# Patient Record
Sex: FEMALE | Race: WHITE | NOT HISPANIC OR LATINO | URBAN - METROPOLITAN AREA
[De-identification: names, ages, dates, MRNs, and addresses within clinical notes are randomized per-mention and may not be internally consistent; named-entity substitution may affect disease eponyms.]

---

## 2023-11-05 ENCOUNTER — EMERGENCY (EMERGENCY)
Facility: HOSPITAL | Age: 33
LOS: 0 days | Discharge: HOME | End: 2023-11-06
Attending: EMERGENCY MEDICINE
Payer: COMMERCIAL

## 2023-11-05 VITALS
DIASTOLIC BLOOD PRESSURE: 83 MMHG | RESPIRATION RATE: 20 BRPM | SYSTOLIC BLOOD PRESSURE: 141 MMHG | HEART RATE: 98 BPM | OXYGEN SATURATION: 100 % | TEMPERATURE: 99 F

## 2023-11-05 DIAGNOSIS — O99.281 ENDOCRINE, NUTRITIONAL AND METABOLIC DISEASES COMPLICATING PREGNANCY, FIRST TRIMESTER: ICD-10-CM

## 2023-11-05 DIAGNOSIS — R42 DIZZINESS AND GIDDINESS: ICD-10-CM

## 2023-11-05 DIAGNOSIS — O20.9 HEMORRHAGE IN EARLY PREGNANCY, UNSPECIFIED: ICD-10-CM

## 2023-11-05 DIAGNOSIS — M54.9 DORSALGIA, UNSPECIFIED: ICD-10-CM

## 2023-11-05 DIAGNOSIS — N93.9 ABNORMAL UTERINE AND VAGINAL BLEEDING, UNSPECIFIED: ICD-10-CM

## 2023-11-05 DIAGNOSIS — O99.891 OTHER SPECIFIED DISEASES AND CONDITIONS COMPLICATING PREGNANCY: ICD-10-CM

## 2023-11-05 DIAGNOSIS — E03.9 HYPOTHYROIDISM, UNSPECIFIED: ICD-10-CM

## 2023-11-05 DIAGNOSIS — Z3A.11 11 WEEKS GESTATION OF PREGNANCY: ICD-10-CM

## 2023-11-05 LAB
ALBUMIN SERPL ELPH-MCNC: 3.9 G/DL — SIGNIFICANT CHANGE UP (ref 3.3–5)
ALBUMIN SERPL ELPH-MCNC: 3.9 G/DL — SIGNIFICANT CHANGE UP (ref 3.3–5)
ALP SERPL-CCNC: 34 U/L — LOW (ref 40–120)
ALP SERPL-CCNC: 34 U/L — LOW (ref 40–120)
ALT FLD-CCNC: 20 U/L — SIGNIFICANT CHANGE UP (ref 12–78)
ALT FLD-CCNC: 20 U/L — SIGNIFICANT CHANGE UP (ref 12–78)
ANION GAP SERPL CALC-SCNC: 6 MMOL/L — SIGNIFICANT CHANGE UP (ref 5–17)
ANION GAP SERPL CALC-SCNC: 6 MMOL/L — SIGNIFICANT CHANGE UP (ref 5–17)
APPEARANCE UR: CLEAR — SIGNIFICANT CHANGE UP
APPEARANCE UR: CLEAR — SIGNIFICANT CHANGE UP
APTT BLD: 28.6 SEC — SIGNIFICANT CHANGE UP (ref 24.5–35.6)
APTT BLD: 28.6 SEC — SIGNIFICANT CHANGE UP (ref 24.5–35.6)
AST SERPL-CCNC: 20 U/L — SIGNIFICANT CHANGE UP (ref 15–37)
AST SERPL-CCNC: 20 U/L — SIGNIFICANT CHANGE UP (ref 15–37)
BACTERIA # UR AUTO: NEGATIVE /HPF — SIGNIFICANT CHANGE UP
BACTERIA # UR AUTO: NEGATIVE /HPF — SIGNIFICANT CHANGE UP
BASOPHILS # BLD AUTO: 0.06 K/UL — SIGNIFICANT CHANGE UP (ref 0–0.2)
BASOPHILS # BLD AUTO: 0.06 K/UL — SIGNIFICANT CHANGE UP (ref 0–0.2)
BASOPHILS NFR BLD AUTO: 0.6 % — SIGNIFICANT CHANGE UP (ref 0–2)
BASOPHILS NFR BLD AUTO: 0.6 % — SIGNIFICANT CHANGE UP (ref 0–2)
BILIRUB SERPL-MCNC: 0.6 MG/DL — SIGNIFICANT CHANGE UP (ref 0.2–1.2)
BILIRUB SERPL-MCNC: 0.6 MG/DL — SIGNIFICANT CHANGE UP (ref 0.2–1.2)
BILIRUB UR-MCNC: NEGATIVE — SIGNIFICANT CHANGE UP
BILIRUB UR-MCNC: NEGATIVE — SIGNIFICANT CHANGE UP
BLD GP AB SCN SERPL QL: SIGNIFICANT CHANGE UP
BLD GP AB SCN SERPL QL: SIGNIFICANT CHANGE UP
BUN SERPL-MCNC: 8 MG/DL — SIGNIFICANT CHANGE UP (ref 7–23)
BUN SERPL-MCNC: 8 MG/DL — SIGNIFICANT CHANGE UP (ref 7–23)
CALCIUM SERPL-MCNC: 9.6 MG/DL — SIGNIFICANT CHANGE UP (ref 8.5–10.1)
CALCIUM SERPL-MCNC: 9.6 MG/DL — SIGNIFICANT CHANGE UP (ref 8.5–10.1)
CAST: 0 /LPF — SIGNIFICANT CHANGE UP (ref 0–4)
CAST: 0 /LPF — SIGNIFICANT CHANGE UP (ref 0–4)
CHLORIDE SERPL-SCNC: 109 MMOL/L — HIGH (ref 96–108)
CHLORIDE SERPL-SCNC: 109 MMOL/L — HIGH (ref 96–108)
CO2 SERPL-SCNC: 25 MMOL/L — SIGNIFICANT CHANGE UP (ref 22–31)
CO2 SERPL-SCNC: 25 MMOL/L — SIGNIFICANT CHANGE UP (ref 22–31)
COLOR SPEC: YELLOW — SIGNIFICANT CHANGE UP
COLOR SPEC: YELLOW — SIGNIFICANT CHANGE UP
CREAT SERPL-MCNC: 0.84 MG/DL — SIGNIFICANT CHANGE UP (ref 0.5–1.3)
CREAT SERPL-MCNC: 0.84 MG/DL — SIGNIFICANT CHANGE UP (ref 0.5–1.3)
DIFF PNL FLD: ABNORMAL
DIFF PNL FLD: ABNORMAL
EGFR: 95 ML/MIN/1.73M2 — SIGNIFICANT CHANGE UP
EGFR: 95 ML/MIN/1.73M2 — SIGNIFICANT CHANGE UP
EOSINOPHIL # BLD AUTO: 0.42 K/UL — SIGNIFICANT CHANGE UP (ref 0–0.5)
EOSINOPHIL # BLD AUTO: 0.42 K/UL — SIGNIFICANT CHANGE UP (ref 0–0.5)
EOSINOPHIL NFR BLD AUTO: 4.3 % — SIGNIFICANT CHANGE UP (ref 0–6)
EOSINOPHIL NFR BLD AUTO: 4.3 % — SIGNIFICANT CHANGE UP (ref 0–6)
GLUCOSE SERPL-MCNC: 113 MG/DL — HIGH (ref 70–99)
GLUCOSE SERPL-MCNC: 113 MG/DL — HIGH (ref 70–99)
GLUCOSE UR QL: NEGATIVE MG/DL — SIGNIFICANT CHANGE UP
GLUCOSE UR QL: NEGATIVE MG/DL — SIGNIFICANT CHANGE UP
HCG SERPL-ACNC: HIGH MIU/ML
HCG SERPL-ACNC: HIGH MIU/ML
HCT VFR BLD CALC: 41.6 % — SIGNIFICANT CHANGE UP (ref 34.5–45)
HCT VFR BLD CALC: 41.6 % — SIGNIFICANT CHANGE UP (ref 34.5–45)
HGB BLD-MCNC: 14.3 G/DL — SIGNIFICANT CHANGE UP (ref 11.5–15.5)
HGB BLD-MCNC: 14.3 G/DL — SIGNIFICANT CHANGE UP (ref 11.5–15.5)
IMM GRANULOCYTES NFR BLD AUTO: 0.3 % — SIGNIFICANT CHANGE UP (ref 0–0.9)
IMM GRANULOCYTES NFR BLD AUTO: 0.3 % — SIGNIFICANT CHANGE UP (ref 0–0.9)
INR BLD: 0.96 RATIO — SIGNIFICANT CHANGE UP (ref 0.85–1.18)
INR BLD: 0.96 RATIO — SIGNIFICANT CHANGE UP (ref 0.85–1.18)
KETONES UR-MCNC: NEGATIVE MG/DL — SIGNIFICANT CHANGE UP
KETONES UR-MCNC: NEGATIVE MG/DL — SIGNIFICANT CHANGE UP
LEUKOCYTE ESTERASE UR-ACNC: NEGATIVE — SIGNIFICANT CHANGE UP
LEUKOCYTE ESTERASE UR-ACNC: NEGATIVE — SIGNIFICANT CHANGE UP
LYMPHOCYTES # BLD AUTO: 2.19 K/UL — SIGNIFICANT CHANGE UP (ref 1–3.3)
LYMPHOCYTES # BLD AUTO: 2.19 K/UL — SIGNIFICANT CHANGE UP (ref 1–3.3)
LYMPHOCYTES # BLD AUTO: 22.6 % — SIGNIFICANT CHANGE UP (ref 13–44)
LYMPHOCYTES # BLD AUTO: 22.6 % — SIGNIFICANT CHANGE UP (ref 13–44)
MCHC RBC-ENTMCNC: 31.8 PG — SIGNIFICANT CHANGE UP (ref 27–34)
MCHC RBC-ENTMCNC: 31.8 PG — SIGNIFICANT CHANGE UP (ref 27–34)
MCHC RBC-ENTMCNC: 34.4 GM/DL — SIGNIFICANT CHANGE UP (ref 32–36)
MCHC RBC-ENTMCNC: 34.4 GM/DL — SIGNIFICANT CHANGE UP (ref 32–36)
MCV RBC AUTO: 92.4 FL — SIGNIFICANT CHANGE UP (ref 80–100)
MCV RBC AUTO: 92.4 FL — SIGNIFICANT CHANGE UP (ref 80–100)
MONOCYTES # BLD AUTO: 0.74 K/UL — SIGNIFICANT CHANGE UP (ref 0–0.9)
MONOCYTES # BLD AUTO: 0.74 K/UL — SIGNIFICANT CHANGE UP (ref 0–0.9)
MONOCYTES NFR BLD AUTO: 7.7 % — SIGNIFICANT CHANGE UP (ref 2–14)
MONOCYTES NFR BLD AUTO: 7.7 % — SIGNIFICANT CHANGE UP (ref 2–14)
NEUTROPHILS # BLD AUTO: 6.23 K/UL — SIGNIFICANT CHANGE UP (ref 1.8–7.4)
NEUTROPHILS # BLD AUTO: 6.23 K/UL — SIGNIFICANT CHANGE UP (ref 1.8–7.4)
NEUTROPHILS NFR BLD AUTO: 64.5 % — SIGNIFICANT CHANGE UP (ref 43–77)
NEUTROPHILS NFR BLD AUTO: 64.5 % — SIGNIFICANT CHANGE UP (ref 43–77)
NITRITE UR-MCNC: NEGATIVE — SIGNIFICANT CHANGE UP
NITRITE UR-MCNC: NEGATIVE — SIGNIFICANT CHANGE UP
PH UR: 6 — SIGNIFICANT CHANGE UP (ref 5–8)
PH UR: 6 — SIGNIFICANT CHANGE UP (ref 5–8)
PLATELET # BLD AUTO: 221 K/UL — SIGNIFICANT CHANGE UP (ref 150–400)
PLATELET # BLD AUTO: 221 K/UL — SIGNIFICANT CHANGE UP (ref 150–400)
POTASSIUM SERPL-MCNC: 3.7 MMOL/L — SIGNIFICANT CHANGE UP (ref 3.5–5.3)
POTASSIUM SERPL-MCNC: 3.7 MMOL/L — SIGNIFICANT CHANGE UP (ref 3.5–5.3)
POTASSIUM SERPL-SCNC: 3.7 MMOL/L — SIGNIFICANT CHANGE UP (ref 3.5–5.3)
POTASSIUM SERPL-SCNC: 3.7 MMOL/L — SIGNIFICANT CHANGE UP (ref 3.5–5.3)
PROT SERPL-MCNC: 7.6 GM/DL — SIGNIFICANT CHANGE UP (ref 6–8.3)
PROT SERPL-MCNC: 7.6 GM/DL — SIGNIFICANT CHANGE UP (ref 6–8.3)
PROT UR-MCNC: NEGATIVE MG/DL — SIGNIFICANT CHANGE UP
PROT UR-MCNC: NEGATIVE MG/DL — SIGNIFICANT CHANGE UP
PROTHROM AB SERPL-ACNC: 10.9 SEC — SIGNIFICANT CHANGE UP (ref 9.5–13)
PROTHROM AB SERPL-ACNC: 10.9 SEC — SIGNIFICANT CHANGE UP (ref 9.5–13)
RBC # BLD: 4.5 M/UL — SIGNIFICANT CHANGE UP (ref 3.8–5.2)
RBC # BLD: 4.5 M/UL — SIGNIFICANT CHANGE UP (ref 3.8–5.2)
RBC # FLD: 12.6 % — SIGNIFICANT CHANGE UP (ref 10.3–14.5)
RBC # FLD: 12.6 % — SIGNIFICANT CHANGE UP (ref 10.3–14.5)
RBC CASTS # UR COMP ASSIST: 308 /HPF — HIGH (ref 0–4)
RBC CASTS # UR COMP ASSIST: 308 /HPF — HIGH (ref 0–4)
SODIUM SERPL-SCNC: 140 MMOL/L — SIGNIFICANT CHANGE UP (ref 135–145)
SODIUM SERPL-SCNC: 140 MMOL/L — SIGNIFICANT CHANGE UP (ref 135–145)
SP GR SPEC: 1.01 — SIGNIFICANT CHANGE UP (ref 1–1.03)
SP GR SPEC: 1.01 — SIGNIFICANT CHANGE UP (ref 1–1.03)
SQUAMOUS # UR AUTO: 0 /HPF — SIGNIFICANT CHANGE UP (ref 0–5)
SQUAMOUS # UR AUTO: 0 /HPF — SIGNIFICANT CHANGE UP (ref 0–5)
UROBILINOGEN FLD QL: 0.2 MG/DL — SIGNIFICANT CHANGE UP (ref 0.2–1)
UROBILINOGEN FLD QL: 0.2 MG/DL — SIGNIFICANT CHANGE UP (ref 0.2–1)
WBC # BLD: 9.67 K/UL — SIGNIFICANT CHANGE UP (ref 3.8–10.5)
WBC # BLD: 9.67 K/UL — SIGNIFICANT CHANGE UP (ref 3.8–10.5)
WBC # FLD AUTO: 9.67 K/UL — SIGNIFICANT CHANGE UP (ref 3.8–10.5)
WBC # FLD AUTO: 9.67 K/UL — SIGNIFICANT CHANGE UP (ref 3.8–10.5)
WBC UR QL: 0 /HPF — SIGNIFICANT CHANGE UP (ref 0–5)
WBC UR QL: 0 /HPF — SIGNIFICANT CHANGE UP (ref 0–5)

## 2023-11-05 PROCEDURE — 80053 COMPREHEN METABOLIC PANEL: CPT

## 2023-11-05 PROCEDURE — 84702 CHORIONIC GONADOTROPIN TEST: CPT

## 2023-11-05 PROCEDURE — 76817 TRANSVAGINAL US OBSTETRIC: CPT

## 2023-11-05 PROCEDURE — 86901 BLOOD TYPING SEROLOGIC RH(D): CPT

## 2023-11-05 PROCEDURE — 86900 BLOOD TYPING SEROLOGIC ABO: CPT

## 2023-11-05 PROCEDURE — 87086 URINE CULTURE/COLONY COUNT: CPT

## 2023-11-05 PROCEDURE — 86850 RBC ANTIBODY SCREEN: CPT

## 2023-11-05 PROCEDURE — 81001 URINALYSIS AUTO W/SCOPE: CPT

## 2023-11-05 PROCEDURE — 99284 EMERGENCY DEPT VISIT MOD MDM: CPT

## 2023-11-05 PROCEDURE — 36415 COLL VENOUS BLD VENIPUNCTURE: CPT

## 2023-11-05 PROCEDURE — 85730 THROMBOPLASTIN TIME PARTIAL: CPT

## 2023-11-05 PROCEDURE — 85025 COMPLETE CBC W/AUTO DIFF WBC: CPT

## 2023-11-05 PROCEDURE — 76817 TRANSVAGINAL US OBSTETRIC: CPT | Mod: 26

## 2023-11-05 PROCEDURE — 99284 EMERGENCY DEPT VISIT MOD MDM: CPT | Mod: 25

## 2023-11-05 PROCEDURE — 85610 PROTHROMBIN TIME: CPT

## 2023-11-05 NOTE — ED STATDOCS - PROGRESS NOTE DETAILS
Marquise Hoskins   performed pelvic exam pt with pooling of blood within vaginal vault unable to visualize secondary to bleeding no tissue or clot in the vaginal vault Marquise Hoskins   Performed pelvic exam pt with pooling of blood within vaginal vault unable to visualize secondary to bleeding no tissue or clot in the vaginal vault.  Chaperone: Shahla Choe PA-C 33 y/o female  at 11 weeks gestation presents to ED c/o sudden onset heavy vaginal bleeding today. OB Dr. Duenas. Last OB appt this past week with no issues. No abdominal pain or cramping. VSS on arrival, on physical exam pt well appearing in NAD, heart RRR lungs CTAB, abd s/nt/nd, neuro grossly intact. Pelvic performed by Dr. Hoskins chaperoned by myself, positive for pooling of blood, no tissue in vaginal vault, unable to visualize cervix d/t bleeding. Pt endorses passing tissue prior to pelvic exam. Will follow labs, ultrasound, reeval. - Shahla Choe PA-C Labs and imaging reviewed. Labs within normal limits. Urine demonstrated blood, no UTI. US with single live gestation at 11 weeks,  bpm, subchorionic hemorrhage, suggestion of prominent brain vesicles and Query gut herniation at the level of midabdomen, possible normal embryology development but recommend f/u with maternal fetal medicine to r/o genetic disorder. Discussed all findings with patient. Advised to call Dr. Duenas tomorrow for follow up. Discussed resting, bleeding precautions, and nothing in vagina until f/u with OB. Patient understands and agrees. Strict return precautions were given. All questions and concerns were addressed. - Shahla Choe PA-C Labs and imaging reviewed. Labs within normal limits. Urine demonstrated blood, no UTI. US with single live gestation at 11 weeks,  bpm, subchorionic hemorrhage, suggestion of prominent brain vesicles and Query gut herniation at the level of midabdomen, possible normal embryology development but recommend f/u with maternal fetal medicine to r/o genetic disorder. Discussed all findings with patient. Discussed that this could still possibly be start of miscarriage. Advised to call Dr. Duenas tomorrow morning for follow up as soon as possible. Discussed bleeding precautions and pelvic rest until f/u with OB. Patient understands and agrees. Strict return precautions were given. All questions and concerns were addressed. - Shahla Choe PA-C

## 2023-11-05 NOTE — ED STATDOCS - PATIENT PORTAL LINK FT
You can access the FollowMyHealth Patient Portal offered by [facility] by registering at the following website: [email or web address]. By joining [facility]’s FollowMyHealth portal, you will also be able to view your health information using other applications (apps) compatible with our system.

## 2023-11-05 NOTE — ED STATDOCS - NSFOLLOWUPINSTRUCTIONS_ED_ALL_ED_FT
Call Dr. Duenas to schedule follow up as soon as possible.    Subchorionic Hemorrhage    WHAT YOU NEED TO KNOW:    A subchorionic hemorrhage (MARÍA ELENA), or hematoma, is a collection of blood between the placenta and the uterus. MARÍA ELENA usually develops late in the first trimester. The bleeding usually reabsorbs into your body by 20 weeks of pregnancy. Most pregnancies progress without problems. You may have occasional spotting or light bleeding throughout your pregnancy.    DISCHARGE INSTRUCTIONS:    Return to the emergency department if:    You have a fever.    You have abdominal pain.    You have a sudden increase in bleeding.  Contact your healthcare provider if:    Your bleeding has increased.    You have questions or concerns about your condition or care.  Pelvic rest: Do not have sex, douche, or use tampons. Do not strain or lift heavy objects. These activities may cause contractions or infection and put you or your baby at risk. You may need to rest more than usual. Do daily activities as directed.    Follow up with your healthcare provider as directed: You may need to return frequently for ultrasounds. Write down your questions so you remember to ask them during your visits. Call Dr. Duenas tomorrow morning to schedule follow up as soon as possible.    Subchorionic Hemorrhage    WHAT YOU NEED TO KNOW:    A subchorionic hemorrhage (MARÍA ELENA), or hematoma, is a collection of blood between the placenta and the uterus. MARÍA ELENA usually develops late in the first trimester. The bleeding usually reabsorbs into your body by 20 weeks of pregnancy. Most pregnancies progress without problems. You may have occasional spotting or light bleeding throughout your pregnancy.    DISCHARGE INSTRUCTIONS:    Return to the emergency department if:    You have a fever.    You have abdominal pain.    You have a sudden increase in bleeding.  Contact your healthcare provider if:    Your bleeding has increased.    You have questions or concerns about your condition or care.  Pelvic rest: Do not have sex, douche, or use tampons. Do not strain or lift heavy objects. These activities may cause contractions or infection and put you or your baby at risk. You may need to rest more than usual. Do daily activities as directed.    Follow up with your healthcare provider as directed: You may need to return frequently for ultrasounds. Write down your questions so you remember to ask them during your visits.

## 2023-11-05 NOTE — ED ADULT TRIAGE NOTE - CHIEF COMPLAINT QUOTE
Pt ambulatory to triage. Pt states that she is 11 weeks pregnant and started heavy vaginal bleeding with sudden onset right before coming in. Pt denies dizziness at this time. NKDA noted.

## 2023-11-05 NOTE — ED ADULT NURSE NOTE - NSFALLUNIVINTERV_ED_ALL_ED
Bed/Stretcher in lowest position, wheels locked, appropriate side rails in place/Call bell, personal items and telephone in reach/Instruct patient to call for assistance before getting out of bed/chair/stretcher/Non-slip footwear applied when patient is off stretcher/[place] to call system/Physically safe environment - no spills, clutter or unnecessary equipment/Purposeful proactive rounding/Room/bathroom lighting operational, light cord in reach

## 2023-11-05 NOTE — ED STATDOCS - CLINICAL SUMMARY MEDICAL DECISION MAKING FREE TEXT BOX
Patient with threatened , subchorionic hemorrhage.  Radiologist recommending MFM given possible anatomic abnormality.  Bleeding precautions given.  Strict return precautions given.

## 2023-11-05 NOTE — ED STATDOCS - OBJECTIVE STATEMENT
31 yo female wPMHx of hypothyroidism presents to the ED c/o heavy vaginal bleeding with sudden onset right before coming in around 8:30am. Pt states she is 11 weeks pregnant. Pt states that it's like water rushing through. This is her 1st pregnancy. Pt had a OB-GYN appointment this past week where things were normal. Pt endorses back pain and slight lightheadedness. Denies pelvic pain.

## 2023-11-06 VITALS
DIASTOLIC BLOOD PRESSURE: 73 MMHG | HEART RATE: 80 BPM | TEMPERATURE: 99 F | OXYGEN SATURATION: 100 % | RESPIRATION RATE: 18 BRPM | SYSTOLIC BLOOD PRESSURE: 108 MMHG

## 2023-11-07 LAB
CULTURE RESULTS: SIGNIFICANT CHANGE UP
CULTURE RESULTS: SIGNIFICANT CHANGE UP
SPECIMEN SOURCE: SIGNIFICANT CHANGE UP
SPECIMEN SOURCE: SIGNIFICANT CHANGE UP

## 2024-02-06 ENCOUNTER — EMERGENCY (EMERGENCY)
Facility: HOSPITAL | Age: 34
LOS: 0 days | Discharge: HOME | End: 2024-02-06
Attending: EMERGENCY MEDICINE
Payer: COMMERCIAL

## 2024-02-06 VITALS
SYSTOLIC BLOOD PRESSURE: 103 MMHG | OXYGEN SATURATION: 97 % | RESPIRATION RATE: 16 BRPM | DIASTOLIC BLOOD PRESSURE: 65 MMHG | HEART RATE: 91 BPM

## 2024-02-06 VITALS — WEIGHT: 160.06 LBS | HEIGHT: 63 IN

## 2024-02-06 DIAGNOSIS — O99.112 OTHER DISEASES OF THE BLOOD AND BLOOD-FORMING ORGANS AND CERTAIN DISORDERS INVOLVING THE IMMUNE MECHANISM COMPLICATING PREGNANCY, SECOND TRIMESTER: ICD-10-CM

## 2024-02-06 DIAGNOSIS — O21.9 VOMITING OF PREGNANCY, UNSPECIFIED: ICD-10-CM

## 2024-02-06 DIAGNOSIS — D72.829 ELEVATED WHITE BLOOD CELL COUNT, UNSPECIFIED: ICD-10-CM

## 2024-02-06 DIAGNOSIS — E03.9 HYPOTHYROIDISM, UNSPECIFIED: ICD-10-CM

## 2024-02-06 DIAGNOSIS — R11.2 NAUSEA WITH VOMITING, UNSPECIFIED: ICD-10-CM

## 2024-02-06 DIAGNOSIS — O99.282 ENDOCRINE, NUTRITIONAL AND METABOLIC DISEASES COMPLICATING PREGNANCY, SECOND TRIMESTER: ICD-10-CM

## 2024-02-06 DIAGNOSIS — Z20.822 CONTACT WITH AND (SUSPECTED) EXPOSURE TO COVID-19: ICD-10-CM

## 2024-02-06 DIAGNOSIS — Z3A.24 24 WEEKS GESTATION OF PREGNANCY: ICD-10-CM

## 2024-02-06 LAB
ALBUMIN SERPL ELPH-MCNC: 3.1 G/DL — LOW (ref 3.3–5)
ALP SERPL-CCNC: 57 U/L — SIGNIFICANT CHANGE UP (ref 40–120)
ALT FLD-CCNC: 28 U/L — SIGNIFICANT CHANGE UP (ref 12–78)
ANION GAP SERPL CALC-SCNC: 5 MMOL/L — SIGNIFICANT CHANGE UP (ref 5–17)
APPEARANCE UR: CLEAR — SIGNIFICANT CHANGE UP
AST SERPL-CCNC: 19 U/L — SIGNIFICANT CHANGE UP (ref 15–37)
BASOPHILS # BLD AUTO: 0.09 K/UL — SIGNIFICANT CHANGE UP (ref 0–0.2)
BASOPHILS NFR BLD AUTO: 0.6 % — SIGNIFICANT CHANGE UP (ref 0–2)
BILIRUB SERPL-MCNC: 0.4 MG/DL — SIGNIFICANT CHANGE UP (ref 0.2–1.2)
BILIRUB UR-MCNC: NEGATIVE — SIGNIFICANT CHANGE UP
BUN SERPL-MCNC: 12 MG/DL — SIGNIFICANT CHANGE UP (ref 7–23)
CALCIUM SERPL-MCNC: 8.9 MG/DL — SIGNIFICANT CHANGE UP (ref 8.5–10.1)
CHLORIDE SERPL-SCNC: 107 MMOL/L — SIGNIFICANT CHANGE UP (ref 96–108)
CO2 SERPL-SCNC: 22 MMOL/L — SIGNIFICANT CHANGE UP (ref 22–31)
COLOR SPEC: YELLOW — SIGNIFICANT CHANGE UP
CREAT SERPL-MCNC: 0.69 MG/DL — SIGNIFICANT CHANGE UP (ref 0.5–1.3)
DIFF PNL FLD: NEGATIVE — SIGNIFICANT CHANGE UP
EGFR: 117 ML/MIN/1.73M2 — SIGNIFICANT CHANGE UP
EOSINOPHIL # BLD AUTO: 0.39 K/UL — SIGNIFICANT CHANGE UP (ref 0–0.5)
EOSINOPHIL NFR BLD AUTO: 2.4 % — SIGNIFICANT CHANGE UP (ref 0–6)
FLUAV AG NPH QL: SIGNIFICANT CHANGE UP
FLUBV AG NPH QL: SIGNIFICANT CHANGE UP
GLUCOSE SERPL-MCNC: 113 MG/DL — HIGH (ref 70–99)
GLUCOSE UR QL: NEGATIVE MG/DL — SIGNIFICANT CHANGE UP
HCT VFR BLD CALC: 37.7 % — SIGNIFICANT CHANGE UP (ref 34.5–45)
HGB BLD-MCNC: 12.7 G/DL — SIGNIFICANT CHANGE UP (ref 11.5–15.5)
IMM GRANULOCYTES NFR BLD AUTO: 0.9 % — SIGNIFICANT CHANGE UP (ref 0–0.9)
KETONES UR-MCNC: NEGATIVE MG/DL — SIGNIFICANT CHANGE UP
LEUKOCYTE ESTERASE UR-ACNC: NEGATIVE — SIGNIFICANT CHANGE UP
LYMPHOCYTES # BLD AUTO: 14.5 % — SIGNIFICANT CHANGE UP (ref 13–44)
LYMPHOCYTES # BLD AUTO: 2.34 K/UL — SIGNIFICANT CHANGE UP (ref 1–3.3)
MCHC RBC-ENTMCNC: 30.5 PG — SIGNIFICANT CHANGE UP (ref 27–34)
MCHC RBC-ENTMCNC: 33.7 GM/DL — SIGNIFICANT CHANGE UP (ref 32–36)
MCV RBC AUTO: 90.6 FL — SIGNIFICANT CHANGE UP (ref 80–100)
MONOCYTES # BLD AUTO: 0.89 K/UL — SIGNIFICANT CHANGE UP (ref 0–0.9)
MONOCYTES NFR BLD AUTO: 5.5 % — SIGNIFICANT CHANGE UP (ref 2–14)
NEUTROPHILS # BLD AUTO: 12.27 K/UL — HIGH (ref 1.8–7.4)
NEUTROPHILS NFR BLD AUTO: 76.1 % — SIGNIFICANT CHANGE UP (ref 43–77)
NITRITE UR-MCNC: NEGATIVE — SIGNIFICANT CHANGE UP
PH UR: 6 — SIGNIFICANT CHANGE UP (ref 5–8)
PLATELET # BLD AUTO: 231 K/UL — SIGNIFICANT CHANGE UP (ref 150–400)
POTASSIUM SERPL-MCNC: 3.7 MMOL/L — SIGNIFICANT CHANGE UP (ref 3.5–5.3)
POTASSIUM SERPL-SCNC: 3.7 MMOL/L — SIGNIFICANT CHANGE UP (ref 3.5–5.3)
PROT SERPL-MCNC: 7 GM/DL — SIGNIFICANT CHANGE UP (ref 6–8.3)
PROT UR-MCNC: SIGNIFICANT CHANGE UP MG/DL
RBC # BLD: 4.16 M/UL — SIGNIFICANT CHANGE UP (ref 3.8–5.2)
RBC # FLD: 12.7 % — SIGNIFICANT CHANGE UP (ref 10.3–14.5)
RSV RNA NPH QL NAA+NON-PROBE: SIGNIFICANT CHANGE UP
SARS-COV-2 RNA SPEC QL NAA+PROBE: SIGNIFICANT CHANGE UP
SODIUM SERPL-SCNC: 134 MMOL/L — LOW (ref 135–145)
SP GR SPEC: 1.02 — SIGNIFICANT CHANGE UP (ref 1–1.03)
UROBILINOGEN FLD QL: 0.2 MG/DL — SIGNIFICANT CHANGE UP (ref 0.2–1)
WBC # BLD: 16.12 K/UL — HIGH (ref 3.8–10.5)
WBC # FLD AUTO: 16.12 K/UL — HIGH (ref 3.8–10.5)

## 2024-02-06 PROCEDURE — 96375 TX/PRO/DX INJ NEW DRUG ADDON: CPT

## 2024-02-06 PROCEDURE — 80053 COMPREHEN METABOLIC PANEL: CPT

## 2024-02-06 PROCEDURE — 99285 EMERGENCY DEPT VISIT HI MDM: CPT

## 2024-02-06 PROCEDURE — 0241U: CPT

## 2024-02-06 PROCEDURE — 76815 OB US LIMITED FETUS(S): CPT

## 2024-02-06 PROCEDURE — 76815 OB US LIMITED FETUS(S): CPT | Mod: 26

## 2024-02-06 PROCEDURE — 36415 COLL VENOUS BLD VENIPUNCTURE: CPT

## 2024-02-06 PROCEDURE — 81003 URINALYSIS AUTO W/O SCOPE: CPT

## 2024-02-06 PROCEDURE — 96376 TX/PRO/DX INJ SAME DRUG ADON: CPT

## 2024-02-06 PROCEDURE — 96374 THER/PROPH/DIAG INJ IV PUSH: CPT

## 2024-02-06 PROCEDURE — 85025 COMPLETE CBC W/AUTO DIFF WBC: CPT

## 2024-02-06 PROCEDURE — 99284 EMERGENCY DEPT VISIT MOD MDM: CPT | Mod: 25

## 2024-02-06 RX ORDER — ALLOPURINOL 300 MG
1 TABLET ORAL
Refills: 0 | DISCHARGE

## 2024-02-06 RX ORDER — LOSARTAN POTASSIUM 100 MG/1
1 TABLET, FILM COATED ORAL
Refills: 0 | DISCHARGE

## 2024-02-06 RX ORDER — ONDANSETRON 8 MG/1
4 TABLET, FILM COATED ORAL ONCE
Refills: 0 | Status: COMPLETED | OUTPATIENT
Start: 2024-02-06 | End: 2024-02-06

## 2024-02-06 RX ORDER — AMLODIPINE BESYLATE 2.5 MG/1
1 TABLET ORAL
Refills: 0 | DISCHARGE

## 2024-02-06 RX ORDER — SODIUM CHLORIDE 9 MG/ML
1000 INJECTION INTRAMUSCULAR; INTRAVENOUS; SUBCUTANEOUS ONCE
Refills: 0 | Status: COMPLETED | OUTPATIENT
Start: 2024-02-06 | End: 2024-02-06

## 2024-02-06 RX ORDER — SENNA PLUS 8.6 MG/1
2 TABLET ORAL
Refills: 0 | DISCHARGE

## 2024-02-06 RX ORDER — ATORVASTATIN CALCIUM 80 MG/1
1 TABLET, FILM COATED ORAL
Refills: 0 | DISCHARGE

## 2024-02-06 RX ORDER — MECLIZINE HCL 12.5 MG
25 TABLET ORAL ONCE
Refills: 0 | Status: COMPLETED | OUTPATIENT
Start: 2024-02-06 | End: 2024-02-06

## 2024-02-06 RX ORDER — HYDROMORPHONE HYDROCHLORIDE 2 MG/ML
0 INJECTION INTRAMUSCULAR; INTRAVENOUS; SUBCUTANEOUS
Refills: 0 | DISCHARGE

## 2024-02-06 RX ORDER — ASPIRIN/CALCIUM CARB/MAGNESIUM 324 MG
1 TABLET ORAL
Refills: 0 | DISCHARGE

## 2024-02-06 RX ORDER — METOCLOPRAMIDE HCL 10 MG
1 TABLET ORAL
Qty: 12 | Refills: 0
Start: 2024-02-06 | End: 2024-02-08

## 2024-02-06 RX ORDER — CARVEDILOL PHOSPHATE 80 MG/1
1 CAPSULE, EXTENDED RELEASE ORAL
Refills: 0 | DISCHARGE

## 2024-02-06 RX ORDER — TIZANIDINE 4 MG/1
1 TABLET ORAL
Refills: 0 | DISCHARGE

## 2024-02-06 RX ORDER — FAMOTIDINE 10 MG/ML
20 INJECTION INTRAVENOUS ONCE
Refills: 0 | Status: COMPLETED | OUTPATIENT
Start: 2024-02-06 | End: 2024-02-06

## 2024-02-06 RX ORDER — METOCLOPRAMIDE HCL 10 MG
10 TABLET ORAL ONCE
Refills: 0 | Status: COMPLETED | OUTPATIENT
Start: 2024-02-06 | End: 2024-02-06

## 2024-02-06 RX ORDER — BUDESONIDE AND FORMOTEROL FUMARATE DIHYDRATE 160; 4.5 UG/1; UG/1
2 AEROSOL RESPIRATORY (INHALATION)
Refills: 0 | DISCHARGE

## 2024-02-06 RX ORDER — POLYETHYLENE GLYCOL 3350 17 G/17G
17 POWDER, FOR SOLUTION ORAL
Refills: 0 | DISCHARGE

## 2024-02-06 RX ORDER — SODIUM CHLORIDE 9 MG/ML
1000 INJECTION, SOLUTION INTRAVENOUS ONCE
Refills: 0 | Status: COMPLETED | OUTPATIENT
Start: 2024-02-06 | End: 2024-02-06

## 2024-02-06 RX ADMIN — ONDANSETRON 4 MILLIGRAM(S): 8 TABLET, FILM COATED ORAL at 09:07

## 2024-02-06 RX ADMIN — SODIUM CHLORIDE 1000 MILLILITER(S): 9 INJECTION INTRAMUSCULAR; INTRAVENOUS; SUBCUTANEOUS at 08:05

## 2024-02-06 RX ADMIN — FAMOTIDINE 20 MILLIGRAM(S): 10 INJECTION INTRAVENOUS at 12:31

## 2024-02-06 RX ADMIN — Medication 10 MILLIGRAM(S): at 12:31

## 2024-02-06 RX ADMIN — SODIUM CHLORIDE 5 MILLILITER(S): 9 INJECTION, SOLUTION INTRAVENOUS at 12:31

## 2024-02-06 RX ADMIN — SODIUM CHLORIDE 2000 MILLILITER(S): 9 INJECTION INTRAMUSCULAR; INTRAVENOUS; SUBCUTANEOUS at 07:01

## 2024-02-06 RX ADMIN — Medication 25 MILLIGRAM(S): at 11:12

## 2024-02-06 RX ADMIN — ONDANSETRON 4 MILLIGRAM(S): 8 TABLET, FILM COATED ORAL at 07:01

## 2024-02-06 NOTE — ED ADULT NURSE NOTE - OBJECTIVE STATEMENT
Pt is 32yo female, A&Ox4, breathing unlabored, presenting to the ED with c/o vomiting and dizziness since 10pm. Pt is 24weeks pregnant . Pt has hx of subchorionic hematoma which has resolved in the last month. Pt has been unable to keep down foods or liquids, pt denies any pain, SOB, chest pain and fevers / chills. Pt denies eating anything unusual, recent illness & sick contacts. Pt has PMHx of hypothyroidism and takes synthroid daily 112mg. Pt is resting in stretcher with  at bedside.

## 2024-02-06 NOTE — ED PROVIDER NOTE - OBJECTIVE STATEMENT
34 y/o female  24 weeks pregnant with a PMHx of subchorionic hematoma from 10-18 weeks, hypothyroid presents to the ED c/o n/v since 10pm last night. NKDA. No other complaints at this time. 32 y/o female  24 weeks pregnant with a PMHx of subchorionic hematoma from 10-18 weeks, hypothyroid presents to the ED c/o n/v since 10pm last night. Denies vaginal bleeding/leakage of fluid. NKDA. No other complaints at this time.

## 2024-02-06 NOTE — ED ADULT TRIAGE NOTE - CHIEF COMPLAINT QUOTE
pt presented to er c/o vomiting since . pt is  24 weeks pregnant. pt also endorsing lightheadedness and diarrhea. denies chest pain, shortness of breath. past medical history of subchorionic hematoma from 10-18 weeks that has resolved. no meds taken prior to arrival. NKA. as per [place] l&timbo, pt to stay in ED.

## 2024-02-06 NOTE — ED PROVIDER NOTE - NSFOLLOWUPINSTRUCTIONS_ED_ALL_ED_FT
Nausea and Vomiting, Adult  Nausea is feeling that you have an upset stomach and that you are about to vomit. Vomiting is when food in your stomach forcefully comes out of your mouth. Vomiting can make you feel weak. If you vomit, or if you are not able to drink enough fluids, you may not have enough water in your body (get dehydrated). If you do not have enough water in your body, you may:  Feel tired.  Feel thirsty.  Have a dry mouth.  Have cracked lips.  Pee (urinate) less often.  Older adults and people with other diseases or a weak body defense system (immune system) are at higher risk for not having enough water in the body. If you feel like you may vomit or you vomit, it is important to follow instructions from your doctor about how to take care of yourself.    Follow these instructions at home:  Watch your symptoms for any changes. Tell your doctor about them.    Eating and drinking    A bottle of clear fruit juice and glass of water.  A sign showing that a person should not drink alcohol.  Take an ORS (oral rehydration solution). This is a drink that is sold at pharmacies and stores.  Drink clear fluids in small amounts as you are able, such as:  Water.  Ice chips.  Fruit juice that has water added (diluted fruit juice).  Low-calorie sports drinks.  Eat bland, easy-to-digest foods in small amounts as you are able, such as:  Bananas.  Applesauce.  Rice.  Low-fat (lean) meats.  Toast.  Crackers.  Avoid drinking fluids that have a lot of sugar or caffeine in them. This includes energy drinks, sports drinks, and soda.  Avoid alcohol.  Avoid spicy or fatty foods.  General instructions    Take over-the-counter and prescription medicines only as told by your doctor.  Drink enough fluid to keep your pee (urine) pale yellow.  Wash your hands often with soap and water for at least 20 seconds. If you cannot use soap and water, use hand .  Make sure that everyone in your home washes their hands well and often.  Rest at home until you feel better.  Watch your condition for any changes.  Take slow and deep breaths when you feel like you may vomit.  Keep all follow-up visits.  Contact a doctor if:  Your symptoms get worse.  You have new symptoms.  You have a fever.  You cannot drink fluids without vomiting.  You feel like you may vomit for more than 2 days.  You feel light-headed or dizzy.  You have a headache.  You have muscle cramps.  You have a rash.  You have pain while peeing.  Get help right away if:  You have pain in your chest, neck, arm, or jaw.  You feel very weak or you faint.  You vomit again and again.  You have vomit that is bright red or looks like black coffee grounds.  You have bloody or black poop (stools) or poop that looks like tar.  You have a very bad headache, a stiff neck, or both.  You have very bad pain, cramping, or bloating in your belly (abdomen).  You have trouble breathing.  You are breathing very quickly.  Your heart is beating very quickly.  Your skin feels cold and clammy.  You feel confused.  You have signs of losing too much water in your body, such as:  Dark pee, very little pee, or no pee.  Cracked lips.  Dry mouth.  Sunken eyes.  Sleepiness.  Weakness.  These symptoms may be an emergency. Get help right away. Call 911.  Do not wait to see if the symptoms will go away.  Do not drive yourself to the hospital.  Summary  Nausea is feeling that you have an upset stomach and that you are about to vomit. Vomiting is when food in your stomach comes out of your mouth.  Follow instructions from your doctor about eating and drinking.  Take over-the-counter and prescription medicines only as told by your doctor.  Contact your doctor if your symptoms get worse or you have new symptoms.  Keep all follow-up visits.  This information is not intended to replace advice given to you by your health care provider. Make sure you discuss any questions you have with your health care provider.    Document Revised: 06/24/2022 Document Reviewed: 06/24/2022  [employer] Patient Education © 2023 [employer]  [employer] logo  Terms and Conditions  Privacy Policy  Editorial Policy  All content on this site: Copyright © 2024 Else[employer], its licensors, and contributors. All rights are reserved, including those for text and data mining, AI training, and similar technologies. For all open access content, the Creative Commons licensing terms apply.  Cookies are used by this site. To decline or le

## 2024-02-06 NOTE — ED ADULT TRIAGE NOTE - GLASGOW COMA SCALE: BEST VERBAL RESPONSE, MLM
no anesthetic complications reported to Anesthesiology team regarding neuraxial anesthesia
(V5) oriented

## 2024-02-06 NOTE — ED PROVIDER NOTE - PATIENT PORTAL LINK FT
You can access the FollowMyHealth Patient Portal offered by [facility] by registering at the following website: [email or web address]. By joining [email or web address]’s FollowMyHealth portal, you will also be able to view your health information using other applications (apps) compatible with our system.

## 2024-02-06 NOTE — ED PROVIDER NOTE - PROGRESS NOTE DETAILS
Marquise Esparza for attending Dr. Reyes: Spoke with Dr. Lynn. Recommends after 8mg of Zofran to give Reglan, lactated ringers and Pepcid. pt feeling much better after reglan will jase Reyes DO

## 2024-04-02 ENCOUNTER — INPATIENT (INPATIENT)
Facility: HOSPITAL | Age: 34
LOS: 4 days | Discharge: HOME | End: 2024-04-07
Attending: OBSTETRICS & GYNECOLOGY | Admitting: OBSTETRICS & GYNECOLOGY
Payer: COMMERCIAL

## 2024-04-02 VITALS
HEART RATE: 109 BPM | DIASTOLIC BLOOD PRESSURE: 91 MMHG | SYSTOLIC BLOOD PRESSURE: 133 MMHG | TEMPERATURE: 99 F | OXYGEN SATURATION: 98 % | RESPIRATION RATE: 18 BRPM

## 2024-04-02 DIAGNOSIS — O42.919 PRETERM PREMATURE RUPTURE OF MEMBRANES, UNSPECIFIED AS TO LENGTH OF TIME BETWEEN RUPTURE AND ONSET OF LABOR, UNSPECIFIED TRIMESTER: ICD-10-CM

## 2024-04-02 DIAGNOSIS — Z3A.32 32 WEEKS GESTATION OF PREGNANCY: ICD-10-CM

## 2024-04-02 DIAGNOSIS — O26.899 OTHER SPECIFIED PREGNANCY RELATED CONDITIONS, UNSPECIFIED TRIMESTER: ICD-10-CM

## 2024-04-02 DIAGNOSIS — E03.9 HYPOTHYROIDISM, UNSPECIFIED: ICD-10-CM

## 2024-04-02 LAB
ALBUMIN SERPL ELPH-MCNC: 2.9 G/DL — LOW (ref 3.3–5)
ALP SERPL-CCNC: 131 U/L — HIGH (ref 40–120)
ALT FLD-CCNC: 33 U/L — SIGNIFICANT CHANGE UP (ref 12–78)
ANION GAP SERPL CALC-SCNC: 6 MMOL/L — SIGNIFICANT CHANGE UP (ref 5–17)
APPEARANCE UR: CLEAR — SIGNIFICANT CHANGE UP
APTT BLD: 23.8 SEC — LOW (ref 24.5–35.6)
AST SERPL-CCNC: 18 U/L — SIGNIFICANT CHANGE UP (ref 15–37)
BACTERIA # UR AUTO: NEGATIVE /HPF — SIGNIFICANT CHANGE UP
BILIRUB SERPL-MCNC: 0.4 MG/DL — SIGNIFICANT CHANGE UP (ref 0.2–1.2)
BILIRUB UR-MCNC: NEGATIVE — SIGNIFICANT CHANGE UP
BLD GP AB SCN SERPL QL: SIGNIFICANT CHANGE UP
BUN SERPL-MCNC: 8 MG/DL — SIGNIFICANT CHANGE UP (ref 7–23)
CALCIUM SERPL-MCNC: 9.5 MG/DL — SIGNIFICANT CHANGE UP (ref 8.5–10.1)
CAST: 0 /LPF — SIGNIFICANT CHANGE UP (ref 0–4)
CHLORIDE SERPL-SCNC: 108 MMOL/L — SIGNIFICANT CHANGE UP (ref 96–108)
CO2 SERPL-SCNC: 22 MMOL/L — SIGNIFICANT CHANGE UP (ref 22–31)
COLOR SPEC: YELLOW — SIGNIFICANT CHANGE UP
CREAT ?TM UR-MCNC: 55 MG/DL — SIGNIFICANT CHANGE UP
CREAT SERPL-MCNC: 0.73 MG/DL — SIGNIFICANT CHANGE UP (ref 0.5–1.3)
DIFF PNL FLD: NEGATIVE — SIGNIFICANT CHANGE UP
EGFR: 111 ML/MIN/1.73M2 — SIGNIFICANT CHANGE UP
FIBRINOGEN PPP-MCNC: 487 MG/DL — HIGH (ref 200–435)
GLUCOSE SERPL-MCNC: 111 MG/DL — HIGH (ref 70–99)
GLUCOSE UR QL: NEGATIVE MG/DL — SIGNIFICANT CHANGE UP
HCT VFR BLD CALC: 36.7 % — SIGNIFICANT CHANGE UP (ref 34.5–45)
HGB BLD-MCNC: 12.1 G/DL — SIGNIFICANT CHANGE UP (ref 11.5–15.5)
INR BLD: 0.85 RATIO — SIGNIFICANT CHANGE UP (ref 0.85–1.18)
KETONES UR-MCNC: NEGATIVE MG/DL — SIGNIFICANT CHANGE UP
LDH SERPL L TO P-CCNC: 161 U/L — SIGNIFICANT CHANGE UP (ref 84–241)
LEUKOCYTE ESTERASE UR-ACNC: ABNORMAL
MCHC RBC-ENTMCNC: 28.3 PG — SIGNIFICANT CHANGE UP (ref 27–34)
MCHC RBC-ENTMCNC: 33 GM/DL — SIGNIFICANT CHANGE UP (ref 32–36)
MCV RBC AUTO: 85.9 FL — SIGNIFICANT CHANGE UP (ref 80–100)
NITRITE UR-MCNC: NEGATIVE — SIGNIFICANT CHANGE UP
PH UR: 6.5 — SIGNIFICANT CHANGE UP (ref 5–8)
PLATELET # BLD AUTO: 282 K/UL — SIGNIFICANT CHANGE UP (ref 150–400)
POTASSIUM SERPL-MCNC: 3.9 MMOL/L — SIGNIFICANT CHANGE UP (ref 3.5–5.3)
POTASSIUM SERPL-SCNC: 3.9 MMOL/L — SIGNIFICANT CHANGE UP (ref 3.5–5.3)
PROT ?TM UR-MCNC: 6 MG/DL — SIGNIFICANT CHANGE UP (ref 0–12)
PROT SERPL-MCNC: 7 GM/DL — SIGNIFICANT CHANGE UP (ref 6–8.3)
PROT UR-MCNC: NEGATIVE MG/DL — SIGNIFICANT CHANGE UP
PROT/CREAT UR-RTO: 0.1 RATIO — SIGNIFICANT CHANGE UP (ref 0–0.2)
PROTHROM AB SERPL-ACNC: 9.6 SEC — SIGNIFICANT CHANGE UP (ref 9.5–13)
RBC # BLD: 4.27 M/UL — SIGNIFICANT CHANGE UP (ref 3.8–5.2)
RBC # FLD: 13.2 % — SIGNIFICANT CHANGE UP (ref 10.3–14.5)
RBC CASTS # UR COMP ASSIST: 3 /HPF — SIGNIFICANT CHANGE UP (ref 0–4)
SODIUM SERPL-SCNC: 136 MMOL/L — SIGNIFICANT CHANGE UP (ref 135–145)
SP GR SPEC: 1.01 — SIGNIFICANT CHANGE UP (ref 1–1.03)
SQUAMOUS # UR AUTO: 4 /HPF — SIGNIFICANT CHANGE UP (ref 0–5)
URATE SERPL-MCNC: 5.5 MG/DL — SIGNIFICANT CHANGE UP (ref 2.5–7)
UROBILINOGEN FLD QL: 0.2 MG/DL — SIGNIFICANT CHANGE UP (ref 0.2–1)
WBC # BLD: 13.02 K/UL — HIGH (ref 3.8–10.5)
WBC # FLD AUTO: 13.02 K/UL — HIGH (ref 3.8–10.5)
WBC UR QL: 2 /HPF — SIGNIFICANT CHANGE UP (ref 0–5)

## 2024-04-02 PROCEDURE — 86920 COMPATIBILITY TEST SPIN: CPT

## 2024-04-02 PROCEDURE — 99214 OFFICE O/P EST MOD 30 MIN: CPT

## 2024-04-02 PROCEDURE — 94640 AIRWAY INHALATION TREATMENT: CPT

## 2024-04-02 PROCEDURE — 86921 COMPATIBILITY TEST INCUBATE: CPT

## 2024-04-02 PROCEDURE — 36415 COLL VENOUS BLD VENIPUNCTURE: CPT

## 2024-04-02 PROCEDURE — C1889: CPT

## 2024-04-02 PROCEDURE — 88307 TISSUE EXAM BY PATHOLOGIST: CPT

## 2024-04-02 PROCEDURE — 59050 FETAL MONITOR W/REPORT: CPT

## 2024-04-02 PROCEDURE — 81001 URINALYSIS AUTO W/SCOPE: CPT

## 2024-04-02 PROCEDURE — 86922 COMPATIBILITY TEST ANTIGLOB: CPT

## 2024-04-02 PROCEDURE — 82570 ASSAY OF URINE CREATININE: CPT

## 2024-04-02 PROCEDURE — 85025 COMPLETE CBC W/AUTO DIFF WBC: CPT

## 2024-04-02 PROCEDURE — 84156 ASSAY OF PROTEIN URINE: CPT

## 2024-04-02 PROCEDURE — 94760 N-INVAS EAR/PLS OXIMETRY 1: CPT

## 2024-04-02 RX ORDER — SODIUM CHLORIDE 9 MG/ML
500 INJECTION, SOLUTION INTRAVENOUS ONCE
Refills: 0 | Status: DISCONTINUED | OUTPATIENT
Start: 2024-04-02 | End: 2024-04-03

## 2024-04-02 RX ORDER — SODIUM CHLORIDE 9 MG/ML
1000 INJECTION, SOLUTION INTRAVENOUS
Refills: 0 | Status: DISCONTINUED | OUTPATIENT
Start: 2024-04-02 | End: 2024-04-07

## 2024-04-02 RX ORDER — ESCITALOPRAM OXALATE 10 MG/1
1 TABLET, FILM COATED ORAL
Refills: 0 | DISCHARGE

## 2024-04-02 RX ORDER — AMPICILLIN TRIHYDRATE 250 MG
2 CAPSULE ORAL ONCE
Refills: 0 | Status: COMPLETED | OUTPATIENT
Start: 2024-04-02 | End: 2024-04-02

## 2024-04-02 RX ORDER — METOCLOPRAMIDE HCL 10 MG
10 TABLET ORAL ONCE
Refills: 0 | Status: COMPLETED | OUTPATIENT
Start: 2024-04-02 | End: 2024-04-02

## 2024-04-02 RX ORDER — AZITHROMYCIN 500 MG/1
1000 TABLET, FILM COATED ORAL ONCE
Refills: 0 | Status: COMPLETED | OUTPATIENT
Start: 2024-04-02 | End: 2024-04-02

## 2024-04-02 RX ORDER — AMPICILLIN TRIHYDRATE 250 MG
1 CAPSULE ORAL EVERY 4 HOURS
Refills: 0 | Status: DISCONTINUED | OUTPATIENT
Start: 2024-04-02 | End: 2024-04-03

## 2024-04-02 RX ORDER — LEVOTHYROXINE SODIUM 125 MCG
1 TABLET ORAL
Refills: 0 | DISCHARGE

## 2024-04-02 RX ADMIN — SODIUM CHLORIDE 125 MILLILITER(S): 9 INJECTION, SOLUTION INTRAVENOUS at 17:45

## 2024-04-02 RX ADMIN — Medication 12 MILLIGRAM(S): at 18:20

## 2024-04-02 RX ADMIN — Medication 108 GRAM(S): at 21:53

## 2024-04-02 RX ADMIN — Medication 10 MILLIGRAM(S): at 17:55

## 2024-04-02 RX ADMIN — AZITHROMYCIN 510 MILLIGRAM(S): 500 TABLET, FILM COATED ORAL at 18:24

## 2024-04-02 RX ADMIN — Medication 216 GRAM(S): at 17:45

## 2024-04-02 NOTE — CONSULT NOTE ADULT - SUBJECTIVE AND OBJECTIVE BOX
36yo  at 32w1d with JOE 24 by LMP presents for evaluation of LOF at 1625. Pt states at 1625pm she noted a large gush of clear/yellow fluid soaking through her pants with continued leaking. She denies vaginal bleeding, ctx/cramping, burning on urination and appreciates positive fetal movement. Pt also notes generalized headache that did not improve with tylenol, denies visual disturbances/scotoma, LE swelling, RUQ/epigastric tenderness, CP, SOB.     Pregnancy course c/b  1. Suspected vasa previa on 3/21 sono with MFM - scheduled for repeat eval 24  2. Resolved subchorionic hematoma - VB 10-18w, resolved  3. Hypothyroid - synthroid 112mcg  4. Anx/depression - Lexapro 10mg daily    GYN hx: denies fibroids, endometriosis, abn PAP, STIs, or cysts  OB hx:   PMHx: +asthma (denies hospitalizations/intubations)  PSHx: denies  Fam hx: denies  NKDA  Social: denies illicit drug use, alcohol and tobacco  Meds: lexapro 10mg daily, synthroid 112mcg, PNV    PE:   VS: 133/91 -> 118/75, all others wnl  Gen: NAD  Abd: non tender to all 4 quadrants including epigastric area, gravid  LE: non tender/edema/erythema b/l  SSE: normal external genitalia, clear fluid trickling down perineum, clear fluid pooling vaginal vault and increased on valsalva, no blood noted, external os not visualized due to copious fluid (performed with Dr. Gay)  SVE deferred  Bedside ultrasound performed by Dr. Leblanc - cephalic, fundal placenta with marginal cord insertion. No evidence of vasa previa. JAVAD 6. uploaded to [facility].    FHR: 150bpm baseline, moderate variability + accelerations, no decelerations (previously having spontaneous decelerations, since resolved)  [place]: no contractions

## 2024-04-02 NOTE — OB RN TRIAGE NOTE - FALL HARM RISK - UNIVERSAL INTERVENTIONS
Bed in lowest position, wheels locked, appropriate side rails in place/Call bell, personal items and telephone in reach/Instruct patient to call for assistance before getting out of bed or chair/Non-slip footwear when patient is out of bed/[place] to call system/Physically safe environment - no spills, clutter or unnecessary equipment/Purposeful Proactive Rounding/Room/bathroom lighting operational, light cord in reach

## 2024-04-02 NOTE — CONSULT NOTE ADULT - ASSESSMENT
34yo  at 32w1d with JOE 24 by LMP presents for evaluation of LOF being admitted for management of PPROM.

## 2024-04-02 NOTE — OB RN PATIENT PROFILE - FALL HARM RISK - UNIVERSAL INTERVENTIONS
Bed in lowest position, wheels locked, appropriate side rails in place/Call bell, personal items and telephone in reach/Instruct patient to call for assistance before getting out of bed or chair/Non-slip footwear when patient is out of bed/[place] to call system/Physically safe environment - no spills, clutter or unnecessary equipment/Purposeful Proactive Rounding/Room/bathroom lighting operational, light cord in reach

## 2024-04-02 NOTE — OB RN TRIAGE NOTE - PATIENT ON (OXYGEN DELIVERY METHOD)
CHRONIC TRANSPLANT NEPHROLOGY VISIT    Assessment & Plan   # DDKT: Increased   - Baseline Cr ~ 1.4-1.6 now with DEB and slow to decline DEB    - Proteinuria: some on UA    - Date DSA Last Checked: Not Known      Latest DSA: Not checked recently due to time from transplant   - BK Viremia: Not checked recently   - Kidney Tx Biopsy: No    # Immunosuppression: Cyclosporine (goal 50-75) and Mycophenolate mofetil (goal not followed)   - Changes: No    # Infection Prophylaxis:   - PJP: Sulfa/TMP (Bactrim)    # Hypertension: Borderline control;  Goal BP: < 130/80   - Changes: will add torsemide to help manage the fluid overload and hydralazine     # Anemia in Chronic Renal Disease: Hgb: Stable      AISHWARYA: No   - Iron studies: Not checked recently    # Mineral Bone Disorder:   - Secondary renal hyperparathyroidism; PTH level: Not checked recently  - Vitamin D; level: Not checked recently        On Supplement: No  - Calcium; level: Normal        On Supplement: No  - Phosphorus; level: Not checked recently        On Supplement: No    # Electrolytes:   - Potassium; level: Low        On Supplement: Yes  - Magnesium; level: Normal        On Supplement: No  - Bicarbonate; level: Normal        On Supplement: No  - Sodium; level: Normal        On Supplement: No    # Left leg cellulitis/hematoma: improved completed several abx course and was seen by the orthopedics. Still swollen but no cellulitic     # Possible Cardiorenal: will diurese slowly and will hold of on biopsy unless cr improves with diuresis.   # Skin Cancer Risk:    - Discussed sun protection and recommend regular follow up with Dermatology.    # Medical Compliance: Yes    # Transplant History:  Etiology of Kidney Failure: Unknown etiology  Tx: DDKT  Transplant: 3/5/2009 (Kidney)  Donor Type: Donation after Brain Death Donor Class: Expanded Criteria Donor  Significant changes in immunosuppression: None  Significant transplant-related complications: None    Transplant  Office Phone Number: [phone]    Assessment and plan was discussed with the patient and he voiced his understanding and agreement.    Return visit: No follow-ups on file.    Chief Complaint   Mr. White is a 80 year old here for routine follow up.    History of Present Illness   Matheus White is an 80 year old male with a history of ESKD secondary to unknown etiology status post DDKT completed on 3/5/2009. He has not previously been seen in [facility] Nephrology clinic. He was last seen on 8/2/19 by Dr. Parkinson at [facility]; please see that note for further details.     He is here for follow up and discuss obtaining biopsy. We also reviewed his weight trends and his previous echo. We discussed potential etiologies including cardiorenal specially with his reduced RV function. We decided on diuresing him first and repeat his labs next.     His blood pressure his been elevated and we discussed changes in regimens to help improve the BP control.      Recent Hospitalizations:  [x] No [] Yes    New Medical Issues: [x] No [] Yes    Decreased energy: [x] No [] Yes    Chest pain or SOB with exertion:  [x] No [] Yes    Appetite change or weight change: [x] No [] Yes    Nausea, vomiting or diarrhea:  [x] No [] Yes    Fever, sweats or chills: [x] No [] Yes    Leg swelling: [] No [x] Yes      Home BP: controlled     Review of Systems   A comprehensive review of systems was obtained and negative, except as noted in the HPI or PMH.    Problem List   Patient Active Problem List   Diagnosis     Acute kidney injury superimposed on chronic kidney disease (H)     Anticoagulation monitoring, INR range 2-3     History of renal transplant     HTN (hypertension)     Permanent atrial fibrillation     Cellulitis of left lower extremity       Social History   Social History     Tobacco Use     Smoking status: Never Smoker     Smokeless tobacco: Never Used   Substance Use Topics     Alcohol use: Yes     Frequency: Monthly or  less     Drinks per session: 1 or 2     Drug use: None       Allergies   No Known Allergies    Medications   Current Outpatient Medications   Medication Sig     cycloSPORINE modified (GENERIC EQUIVALENT) 25 MG capsule Take 50 mg by mouth 2 times daily     diltiazem ER COATED BEADS (CARDIZEM CD/CARTIA XT) 180 MG 24 hr capsule Take 180 mg by mouth daily     gabapentin (NEURONTIN) 300 MG capsule Take 300 mg by mouth daily     hydrALAZINE (APRESOLINE) 25 MG tablet Take 1 tablet (25 mg) by mouth 2 times daily     mycophenolate (GENERIC EQUIVALENT) 500 MG tablet Take 500 mg by mouth 2 times daily     order for DME Equipment being ordered: Cane ()  Treatment Diagnosis: Gait Instability     potassium citrate 15 MEQ (1620 MG) TBCR Take 1 tablet by mouth 2 times daily     torsemide (DEMADEX) 20 MG tablet Take 2 tablets (40 mg) by mouth every morning. May also take 1 tablet (20 mg) every evening as needed (if not on track for net negative 2 Lb/Day).     warfarin ANTICOAGULANT (COUMADIN) 3 MG tablet Take 3 mg by mouth daily     No current facility-administered medications for this visit.      Medications Discontinued During This Encounter   Medication Reason     mupirocin (BACTROBAN) 2 % external cream      mupirocin (BACTROBAN) 2 % external ointment        Physical Exam   Vital Signs: BP (!) 158/84 (BP Location: Right arm, Cuff Size: Adult Large)   Pulse 69   Temp 98.2  F (36.8  C)   Wt 84.7 kg (186 lb 11.2 oz)   SpO2 95%   BMI 26.05 kg/m      GENERAL APPEARANCE: alert and no distress  HENT: mouth without ulcers or lesions  LYMPHATICS: no cervical or supraclavicular nodes  RESP: lungs clear to auscultation - no rales, rhonchi or wheezes  CV: regular rhythm, normal rate, no rub, no murmur  EDEMA: +1-2 LE edema bilaterally  ABDOMEN: soft, nondistended, nontender, bowel sounds normal  MS: extremities normal - no gross deformities noted, no evidence of inflammation in joints, no muscle tenderness  SKIN: no rash      Data      Renal Latest Ref Rng & Units 12/20/2019 12/17/2019 12/9/2019   Na 133 - 144 mmol/L 142 142 142   Na (external) 135 - 145 mmol/L - - -   K 3.4 - 5.3 mmol/L 3.5 3.3(L) 3.2(L)   K (external) 3.5 - 5.0 mmol/L - - -   Cl 94 - 109 mmol/L 106 104 108   Cl (external) 98 - 110 mmol/L - - -   CO2 20 - 32 mmol/L 28 29 27   CO2 (external) 21 - 31 mmol/L - - -   BUN 7 - 30 mg/dL 41(H) 31(H) 27   BUN (external) 8 - 25 mg/dL - - -   Cr 0.66 - 1.25 mg/dL 3.30(H) 2.68(H) 2.28(H)   Cr (external) 0.72 - 1.25 mg/dL - - -   Glucose 70 - 99 mg/dL 105(H) 95 94   Glucose (external) 65 - 100 mg/dL - - -   Ca  8.5 - 10.1 mg/dL 8.7 9.1 8.9   Ca (external) 8.5 - 10.5 mg/dL - - -   Mg 1.6 - 2.3 mg/dL - - -   Mg (external) 1.9 - 2.7 mg/dL - - -     Bone Health Latest Ref Rng & Units 2/9/2018 7/17/2009 7/7/2009   Phos 2.5 - 4.5 mg/dL - 2.2(L) 2.0(L)   Phos (external) 2.5 - 5.0 mg/dL 2.2(L) - -   PTHi 12 - 72 pg/mL - - -   Vit D Def (external) >=29 ng/mL 55 - -     Heme Latest Ref Rng & Units 12/20/2019 12/17/2019 12/9/2019   WBC 4.0 - 11.0 10e9/L 5.6 5.2 5.4   WBC (external) 4.5 - 11.0 thou/cu mm - - -   Hgb 13.3 - 17.7 g/dL 9.5(L) 9.5(L) 10.1(L)   Hgb (external) 13.5 - 17.5 g/dL - - -   Plt 150 - 450 10e9/L 220 202 209   Plt (external) 140 - 440 thou/cu mm - - -     Liver Latest Ref Rng & Units 10/11/2019 2/9/2018 3/5/2009   AP 40 - 150 U/L 230(H) - 87   AP (external) 34 - 104 U/L - 118(H) -   TBili 0.2 - 1.3 mg/dL 1.8(H) - 0.4   TBili (external) 0.3 - 1.0 mg/dL - 1.6(H) -   ALT 0 - 70 U/L 21 - 8   ALT (external) 7 - 52 U/L - 12 -   AST 0 - 45 U/L 16 - 29   AST (external) 13 - 39 U/L - 25 -   Tot Protein 6.8 - 8.8 g/dL 7.0 - 6.8   Tot Protein (external) 6.4 - 8.9 g/dL - 7.3 -   Albumin 3.4 - 5.0 g/dL 3.3(L) - 3.9   Albumin (external) 3.5 - 5.7 gm/dL - 4.4 -     Pancreas Latest Ref Rng & Units 7/28/2008   Lipase 20 - 250 U/L 56     Iron studies Latest Ref Rng & Units 4/9/2009 3/11/2009   Iron 35 - 180 ug/dL 94 47        room air

## 2024-04-02 NOTE — CHART NOTE - NSCHARTNOTEFT_GEN_A_CORE
Patient seen at bedside secondary to spontaneous prolonged deceleration.  FHR monitor readjusted and patient repositioned to right lateral. FHR recovered.

## 2024-04-02 NOTE — CHART NOTE - NSCHARTNOTEFT_GEN_A_CORE
Patient had a 6m deceleration in the 90s after returning from the bathroom. Patient repositioned with return to baseline. SSE repeated. Cervix still closed on exam. Continue to closely monitor.

## 2024-04-02 NOTE — CONSULT NOTE ADULT - PROBLEM SELECTOR RECOMMENDATION 3
- PPROM at 1625 4/2  - Latency antibiotics  - BMZ for fetal lung maturity  - Continuous EFM for now  - Plan for delivery at 34w GA

## 2024-04-02 NOTE — OB PROVIDER H&P - NS ATTEND AMEND GEN_ALL_CORE FT
Agree with above assessment and plan. Patient admitted for premature prelabor rupture of membranes in the setting of suspected vasa previa.  Spoke with patient regarding betamethasone, latency antibiotics, and importance of the next 24 hours.  Questions encouraged and answered.  Discussed the possibility of the  needing a blood transfusion--blood bank is aware and has a CMV neg, Hgb S neg, radiated O negative unit of blood on hold under the mother's name, the blood bank has ordered 2 more units.    On bedside speculum exam, gross pooling without blood was visualized.  Cervical os was not visualized.  In the setting of suspected vasa previa and absent contractions, no digital exam was performed.      Bedside ultrasound performed (images sent to [facility])--abdominal US performed, transvaginal US was deferred at this time secondary to limited sonogram experience in this clinical scenario  EFW 1500g  JAVAD 11cm  vertex  posterior fundal placenta    Patient seen at bedside.  Consented for  section, and any other indicated procedures.  The risks, benefits and alternatives of all above procedures were discussed. Questions were encouraged and answered. Written consent was signed.     Spoke with Dr. Leblanc ([facility]) and he said he will be in to evaluate the patient.

## 2024-04-02 NOTE — OB RN PATIENT PROFILE - NAME OF FATHER, OB PROFILE
PT AWAKE, RESTING IN BED. C/O BACK PAIN 8/10. FLEXERIL GIVEN PER ORDER. DENIES
NAUSEA AT THIS TIME. BREATHING EVEN/UNLABORED ON RA.  DR. FARR TO CONSULT
WITH ATTENDING REGARDING POSSIBLE MRI OF LUMBAR SPINE. CHARGE NURSE AND DR. JIM AWARE. NO SIGNIFICANT CHANGES DURING SHIFT. CALL LIGHT WITHIN REACH, BED
AT LOWEST POSITION. WILL ENDORSE TO DAY NURSE. Reji Oliva

## 2024-04-02 NOTE — CONSULT NOTE ADULT - CRITICAL CARE ATTENDING COMMENT
Patient admitted at approximately 4pm this evening with clear amniotic fluid.  Denies vaginal bleeding.  States that she has had Ultrasound studies at [facility].  Findings stated consistent with Vasa Previa.    Bedside sono done tonight which demonstrates JAVAD of 6-7 cm.  Fetal vertex is well applied to the cervix.  No evidence of funic presentation as stated in earlier ultrasounds.  Placenta is fundal with marginal insertion of the umbilical cord at the anterior margin.  Placenta is grade 2-3.  Umbilical cord is noted in a lateral pocket.  Does not appear to be Vasa previa.  In light of PPROM without bleeding, the diagnosis is unlikely, but NOT completely eliminated.  Patient will be on continuous monitoring.  First dose of Betamethasone given.  Second dose in 24 hours.  Will observe.  If vaginal bleeding occurs will perform C/S.  If patient remains stable will observe until 34 weeks.  Will recommend IOL if Vasa previa again ruled out.  All of the above was discussed with Demi and her  and all of their questions were answered.

## 2024-04-02 NOTE — OB PROVIDER H&P - HISTORY OF PRESENT ILLNESS
OB Provider H&P   Primary OB: Dr. Duenas  CC: LOF    HPI: 34yo  at 32w1d with JOE 24 by LMP presents for evaluation of LOF. Pt states at 425pm she noted a large gush of clear/yellow fluid soaking through her pants with continued leaking. She denies vaginal bleeding, ctx/cramping, burning on urination and appreciates positive fetal movement. Pt also notes generalized headache that did not improve with tylenol, denies visual disturbances/scotoma, LE swelling, RUQ/epigastric tenderness, CP, SOB.     AP issues:   #suspected vasa previa  - suspected on 3/21 with MFM due for repeat sono 24    # Resolved subchorionic hematoma  - had vaginal bleeding 10-18w  - now resolved    #hypothyroid  - on synthroid 112mcg daily    # Anxiety/depression  - on lexapro 10mg daily  [] peds @ delivery    GYN hx: denies fibroids, endometriosis, abn PAP, STIs, or cysts  OB hx:   PMHx: +asthma (denies hospitalizations/intubations)  PSHx: denies  Fam hx: denies  NKDA  Social: denies illicit drug use, alcohol and tobacco  Meds: lexapro 10mg daily, synthroid 112mcg, PNV    PNL and prior sono pending chart from primary OB    PE:   VS: 133/91 -> 118/75, all others wnl  Gen: NAD  Abd: non tender to all 4 quadrants including epigastric area, gravid  LE: non tender/edema/erythema b/l  SSE: normal external genitalia, clear fluid trickling down perineum, clear fluid pooling vaginal vault and increased on valsalva, no blood noted, external os not visualized due to copious fluid (performed with Dr. Gay)  SVE deferred    Bedside sono: performed by Dr. Gay uploaded to [facility], DVP 3.5cm vtx  EFM: 150bpm baseline, moderate variability, + accels, 4min prolonged decel to 120s with recovery to baseline  toco: None    A/P: 34yo  at 32w1d presents for evaluation of LOF now with confirmed PPROM ( @ 425pm) however pt with suspected vasa previa on previous MFM sono. Pt with variables and 1 prolonged decel however otherwise reassuring with moderate variability and accels and adequate DVP. 1 mild range BP with subsequent normal ranges, plan to draw PIH labs and give reglan for HA. Plan below  - Dr. Leblanc ([facility]) called, will come for official sono to determine presence of vasa previa. Dr. Duenas to send prenatal chart  - admit labs  - PIH labs w/UPC  - Ucx and GBS  - BMTZ for FLM  - Ampicillin/azithromycin  - keep NPO  - IV with LR fluids  - monitor BP and HA s/p reglan   - review PNC once obtained  - blood on hold for baby -> NICU consult    Pt seen with and plan discussed with Dr. Deidra HAY1, Dr. Duenas aware

## 2024-04-02 NOTE — CONSULT NOTE ADULT - PROBLEM SELECTOR RECOMMENDATION 4
- Outpatient MFM sono at 30w concern for vasa previa   - Bedside sono performed by MFM - Placenta is fundal with marginal insertion of the umbilical cord at the anterior margin. Placenta is grade 2-3. Umbilical cord is noted in a lateral pocket.  - Does not appear to be Vasa previa.  - PPROM without bleeding; vasa previa unlikely  - Continuous EFM   - Keep NPO  - Plan for official sono tomorrow

## 2024-04-03 LAB — T PALLIDUM AB TITR SER: NEGATIVE — SIGNIFICANT CHANGE UP

## 2024-04-03 PROCEDURE — 88307 TISSUE EXAM BY PATHOLOGIST: CPT | Mod: 26

## 2024-04-03 RX ORDER — OXYCODONE HYDROCHLORIDE 5 MG/1
5 TABLET ORAL ONCE
Refills: 0 | Status: DISCONTINUED | OUTPATIENT
Start: 2024-04-03 | End: 2024-04-07

## 2024-04-03 RX ORDER — SODIUM CHLORIDE 9 MG/ML
1000 INJECTION, SOLUTION INTRAVENOUS
Refills: 0 | Status: DISCONTINUED | OUTPATIENT
Start: 2024-04-03 | End: 2024-04-07

## 2024-04-03 RX ORDER — ACETAMINOPHEN 500 MG
975 TABLET ORAL
Refills: 0 | Status: DISCONTINUED | OUTPATIENT
Start: 2024-04-03 | End: 2024-04-07

## 2024-04-03 RX ORDER — OXYTOCIN 10 UNIT/ML
333.33 VIAL (ML) INJECTION
Qty: 20 | Refills: 0 | Status: DISCONTINUED | OUTPATIENT
Start: 2024-04-03 | End: 2024-04-07

## 2024-04-03 RX ORDER — DIPHENHYDRAMINE HCL 50 MG
25 CAPSULE ORAL EVERY 6 HOURS
Refills: 0 | Status: DISCONTINUED | OUTPATIENT
Start: 2024-04-03 | End: 2024-04-07

## 2024-04-03 RX ORDER — MAGNESIUM HYDROXIDE 400 MG/1
30 TABLET, CHEWABLE ORAL
Refills: 0 | Status: DISCONTINUED | OUTPATIENT
Start: 2024-04-03 | End: 2024-04-07

## 2024-04-03 RX ORDER — IBUPROFEN 200 MG
600 TABLET ORAL EVERY 6 HOURS
Refills: 0 | Status: COMPLETED | OUTPATIENT
Start: 2024-04-03 | End: 2025-03-02

## 2024-04-03 RX ORDER — LANOLIN ALCOHOL/MO/W.PET/CERES
3 CREAM (GRAM) TOPICAL AT BEDTIME
Refills: 0 | Status: DISCONTINUED | OUTPATIENT
Start: 2024-04-03 | End: 2024-04-07

## 2024-04-03 RX ORDER — TETANUS TOXOID, REDUCED DIPHTHERIA TOXOID AND ACELLULAR PERTUSSIS VACCINE, ADSORBED 5; 2.5; 8; 8; 2.5 [IU]/.5ML; [IU]/.5ML; UG/.5ML; UG/.5ML; UG/.5ML
0.5 SUSPENSION INTRAMUSCULAR ONCE
Refills: 0 | Status: DISCONTINUED | OUTPATIENT
Start: 2024-04-03 | End: 2024-04-07

## 2024-04-03 RX ORDER — ESCITALOPRAM OXALATE 10 MG/1
10 TABLET, FILM COATED ORAL DAILY
Refills: 0 | Status: DISCONTINUED | OUTPATIENT
Start: 2024-04-03 | End: 2024-04-07

## 2024-04-03 RX ORDER — DIPHENHYDRAMINE HCL 50 MG
25 CAPSULE ORAL ONCE
Refills: 0 | Status: COMPLETED | OUTPATIENT
Start: 2024-04-03 | End: 2024-04-03

## 2024-04-03 RX ORDER — SIMETHICONE 80 MG/1
80 TABLET, CHEWABLE ORAL EVERY 4 HOURS
Refills: 0 | Status: DISCONTINUED | OUTPATIENT
Start: 2024-04-03 | End: 2024-04-07

## 2024-04-03 RX ORDER — ENOXAPARIN SODIUM 100 MG/ML
40 INJECTION SUBCUTANEOUS EVERY 24 HOURS
Refills: 0 | Status: DISCONTINUED | OUTPATIENT
Start: 2024-04-03 | End: 2024-04-07

## 2024-04-03 RX ORDER — KETOROLAC TROMETHAMINE 30 MG/ML
30 SYRINGE (ML) INJECTION EVERY 6 HOURS
Refills: 0 | Status: DISCONTINUED | OUTPATIENT
Start: 2024-04-03 | End: 2024-04-04

## 2024-04-03 RX ORDER — LEVOTHYROXINE SODIUM 125 MCG
112 TABLET ORAL DAILY
Refills: 0 | Status: DISCONTINUED | OUTPATIENT
Start: 2024-04-03 | End: 2024-04-07

## 2024-04-03 RX ORDER — LANOLIN
1 OINTMENT (GRAM) TOPICAL EVERY 6 HOURS
Refills: 0 | Status: DISCONTINUED | OUTPATIENT
Start: 2024-04-03 | End: 2024-04-07

## 2024-04-03 RX ORDER — BUDESONIDE AND FORMOTEROL FUMARATE DIHYDRATE 160; 4.5 UG/1; UG/1
2 AEROSOL RESPIRATORY (INHALATION)
Refills: 0 | Status: DISCONTINUED | OUTPATIENT
Start: 2024-04-03 | End: 2024-04-07

## 2024-04-03 RX ORDER — FAMOTIDINE 10 MG/ML
20 INJECTION INTRAVENOUS ONCE
Refills: 0 | Status: DISCONTINUED | OUTPATIENT
Start: 2024-04-03 | End: 2024-04-07

## 2024-04-03 RX ORDER — OXYCODONE HYDROCHLORIDE 5 MG/1
5 TABLET ORAL
Refills: 0 | Status: DISCONTINUED | OUTPATIENT
Start: 2024-04-03 | End: 2024-04-07

## 2024-04-03 RX ORDER — ACETAMINOPHEN 500 MG
1000 TABLET ORAL ONCE
Refills: 0 | Status: COMPLETED | OUTPATIENT
Start: 2024-04-03 | End: 2024-04-03

## 2024-04-03 RX ORDER — CITRIC ACID/SODIUM CITRATE 300-500 MG
30 SOLUTION, ORAL ORAL ONCE
Refills: 0 | Status: DISCONTINUED | OUTPATIENT
Start: 2024-04-03 | End: 2024-04-07

## 2024-04-03 RX ORDER — SODIUM CHLORIDE 9 MG/ML
1000 INJECTION, SOLUTION INTRAVENOUS ONCE
Refills: 0 | Status: COMPLETED | OUTPATIENT
Start: 2024-04-03 | End: 2024-04-03

## 2024-04-03 RX ADMIN — Medication 30 MILLIGRAM(S): at 11:56

## 2024-04-03 RX ADMIN — Medication 975 MILLIGRAM(S): at 15:54

## 2024-04-03 RX ADMIN — Medication 30 MILLIGRAM(S): at 12:31

## 2024-04-03 RX ADMIN — SIMETHICONE 80 MILLIGRAM(S): 80 TABLET, CHEWABLE ORAL at 20:57

## 2024-04-03 RX ADMIN — Medication 975 MILLIGRAM(S): at 15:17

## 2024-04-03 RX ADMIN — Medication 400 MILLIGRAM(S): at 05:14

## 2024-04-03 RX ADMIN — Medication 30 MILLIGRAM(S): at 17:58

## 2024-04-03 RX ADMIN — ENOXAPARIN SODIUM 40 MILLIGRAM(S): 100 INJECTION SUBCUTANEOUS at 17:58

## 2024-04-03 RX ADMIN — SODIUM CHLORIDE 125 MILLILITER(S): 9 INJECTION, SOLUTION INTRAVENOUS at 05:14

## 2024-04-03 RX ADMIN — SODIUM CHLORIDE 1000 MILLILITER(S): 9 INJECTION, SOLUTION INTRAVENOUS at 04:00

## 2024-04-03 RX ADMIN — Medication 112 MICROGRAM(S): at 21:00

## 2024-04-03 RX ADMIN — Medication 25 MILLIGRAM(S): at 05:30

## 2024-04-03 RX ADMIN — Medication 975 MILLIGRAM(S): at 21:03

## 2024-04-03 RX ADMIN — Medication 30 MILLIGRAM(S): at 18:56

## 2024-04-03 RX ADMIN — ESCITALOPRAM OXALATE 10 MILLIGRAM(S): 10 TABLET, FILM COATED ORAL at 21:00

## 2024-04-03 RX ADMIN — Medication 975 MILLIGRAM(S): at 20:57

## 2024-04-03 NOTE — OB PROVIDER LABOR PROGRESS NOTE - ASSESSMENT
P0 32+2 weeks with PPROM, now with persistent category II tracing remote from delivery, not improving with resuscitative measures for PLTCS  findings explained to patient in detail, R/B/A of PLTCS explained to patient in detail, patient verbalized understanding and agreed to the procedure  patient aware baby is going to NICU due to prematurity, NICU on board  Anesthesia notified  To the OR when ready  
P0 32+1 weeks admitted with PPROM since 1625h  s/p beta on latency antibiotics  previously there was a concern for vasa previa, MFM did bedside US tonight no evidence of vasa previa at this time, fundal placenta  patient with intermittent category II tracing, recently had prolonged decel after returning from bathroom, which resolved with positional changes and IV bolus  repeat speculum exam: closed cervix, blood tinged fluid  keep NPO  monitor for signs of abruption   anesthesia and NICU aware of patient  monitor closely

## 2024-04-03 NOTE — OB PROVIDER DELIVERY SUMMARY - NSLOWPPHRISK_OBGYN_A_OB
No previous uterine incision/[place] Pregnancy/Less than or equal to 4 previous vaginal births/No known bleeding disorder/No history of postpartum hemorrhage/No other PPH risks indicated

## 2024-04-03 NOTE — OB PROVIDER LABOR PROGRESS NOTE - NS_OBIHIFHRDETAILS_OBGYN_ALL_OB_FT
baseline 150s, moderate variability, 4 min prolonged decels followed by recurrent variables jordyn down to the 90s at times
baseline 150s, minimal variability, prolonged decel 6-7 min, jordyn 90s, recovered to baseline with positional changes

## 2024-04-03 NOTE — OB NEONATOLOGY/PEDIATRICIAN DELIVERY SUMMARY - NSPEDSNEONOTESA_OBGYN_ALL_OB_FT
BABY NEGRA is a 32.2 wk   born at 0240 on 4/3/24 via urgent C/S for NRFHTs to 36 yo   A+/ unknown GBS/ RPR NR/ HIV neg/ hepBSAg neg/RI mom with PNC.  Mom on Synthroid for Hypothyroidism.  On Lexapro for Anxiety/ Depression.  L&D: Mom admitted 4/2 PM with c/o SROM. at 0025 [  ]. PPROM x 26+ hours.  Afebrile mom, pretreated x 2 doses of Ampicillin, and  dose of Zithromax.  BMZ x 1 at 1820 [  ].  Urgent C/S for Cat II tracings.  Upon delivery  AS  A/P: 32.2 wk  infant delivered by urgent C/S for Cat II tracings. H/o PPROM x 26+ hours.  Transfer infant to SCN for further management.  Velasquez spoke with parents and explained reason/ need for SCN admission, as well as our treatment plan, including possibility of transferring infant to tertiary center.    Haroon Garcia MD  Suspected Vasa previa on [facility] sono on 3/21. Repeat sono  negative  H/o subchorionic hematoma with bleeding at 18 wk gestation; resolved BABY MAX OMER is a 32.2 wk   born at 0231 on 4/3/24 via urgent C/S for NRFHTs to 36 yo  A+/ unknown GBS/ RPR NR/ HIV neg/ hepBSAg neg/RI mom with PNC.  Mom on Synthroid for Hypothyroidism.  On Lexapro for Anxiety/ Depression.  Suspected Vasa previa on [facility] sono on 3/21. Repeat sono  negative  H/o subchorionic hematoma with bleeding at 18 wk gestation; resolved  L&D: Mom admitted 4/2 PM with c/o SROM. at 1625 [  ]. PPROM x 10 hour PTD.  Afebrile mom, pretreated x 2 doses of Ampicillin, and  dose of Zithromax.  BMZ x 1 at 1820 [  ].  Urgent C/S for Cat II tracings.  Upon delivery clear AF; vertex; delayed cord clampimg  Slightly decreased tone.  Spontaneous cry  Warmed under KDC warmer. Suctioned, dried, stimulated and given CPAP with up to 60% FiO2 with the T-piece, for alveolar recruitment. O2 sats mid 90's at 5 min. FiO2 titrated down while maintaining targeted O2 sats ranges.  AS .  A/P: 32.2 wk  infant delivered by urgent C/S for Cat II tracings. H/o PPROM x 26+ hours.  Transfer infant to SCN for further management.  Velasquez spoke with parents and explained reason/ need for SCN admission, as well as our treatment plan, including possibility of transferring infant to tertiary center.    Haroon Garcia MD

## 2024-04-03 NOTE — OB NEONATOLOGY/PEDIATRICIAN DELIVERY SUMMARY - NSMATERNALFETALCONCERNS_OBGYN_ALL_OB_FT
Urgent C/S at 32.2 wk gest for Cat II tracings. PPROM Urgent C/S at 32.2 wk gest for Cat II tracings. PPROM x 10 h

## 2024-04-03 NOTE — OB PROVIDER DELIVERY SUMMARY - NSSELHIDDEN_OBGYN_ALL_OB_FT
[NS_DeliveryAttending1_OBGYN_ALL_OB_FT:[id number]],[NS_DeliveryAssist1_OBGYN_ALL_OB_FT:[id number]],[NS_DeliveryRN_OBGYN_ALL_OB_FT:[id number]]

## 2024-04-03 NOTE — OB RN INTRAOPERATIVE NOTE - NSSELHIDDEN_OBGYN_ALL_OB_FT
[NS_DeliveryAttending1_OBGYN_ALL_OB_FT:[id number]],[NS_DeliveryAssist1_OBGYN_ALL_OB_FT:[id number]],[NS_DeliveryRN_OBGYN_ALL_OB_FT:[id number]]

## 2024-04-03 NOTE — OB PROVIDER DELIVERY SUMMARY - NSPROVIDERDELIVERYNOTE_OBGYN_ALL_OB_FT
Pt taken to the OR for primary C/S due to Cat II tracing due to suspected vasa previa.  Not in labor.  spinal anesthesia.  EBL 500cc  UOP 150cc  IVF 1500cc    Delivered live male infant, vtx, through moderate amount of clear amniotic fluid.  No nuchal cord. Vasa previa on inspection of the placenta  Uterus, tubes, ovaries WNL.   Hysterotomy was reapproximated with suture, excellent hemostasis obtained.  Fascia and subcutaneous tissues were reapproximated with suture, excellent hemostasis obtained.  skin closed with monocryl      weight pending. Infant brought to NICU  APGAR 8/9.     No intraoperative complications Pt taken to the OR for primary C/S due to Cat II tracing due to suspected vasa previa.  Not in labor.  spinal anesthesia.  EBL 500cc  UOP 150cc  IVF 1500cc    Delivered live male infant, vtx, through moderate amount of clear amniotic fluid.  No nuchal cord. Vasa previa on inspection of the placenta  Uterus, tubes, ovaries WNL.   Hysterotomy was reapproximated with suture, excellent hemostasis obtained.  Fascia and subcutaneous tissues were reapproximated with suture, excellent hemostasis obtained.  skin closed with monocryl     [place] weight pending. Infant brought to NICU  APGAR 8/9.     No intraoperative complications    Dictation#[id number] Pt taken to the OR for primary C/S due to persistent Cat II tracing due to suspected vasa previa.  Not in labor.  spinal anesthesia.  EBL 500cc  UOP 150cc  IVF 1500cc    Delivered live male infant, vtx, through moderate amount of clear amniotic fluid.  No nuchal cord. Vasa previa on inspection of the placenta, sent pathology.  Uterus, tubes, ovaries WNL.   Hysterotomy was reapproximated with suture, excellent hemostasis obtained. Fibrillar placed over hysterotomy.  Fascia and subcutaneous tissues were reapproximated with suture, excellent hemostasis obtained.  skin closed with monocryl, dermabond and mepilex dressing applied      weight pending. Infant brought to NICU  APGAR 8/9.     No intraoperative complications    Dictation#[id number]

## 2024-04-03 NOTE — OB RN DELIVERY SUMMARY - NSSELHIDDEN_OBGYN_ALL_OB_FT
[NS_DeliveryAttending1_OBGYN_ALL_OB_FT:[id number]],[NS_DeliveryAssist1_OBGYN_ALL_OB_FT:[id number]],[NS_DeliveryRN_OBGYN_ALL_OB_FT:[id number]]

## 2024-04-04 LAB
BASOPHILS # BLD AUTO: 0.07 K/UL — SIGNIFICANT CHANGE UP (ref 0–0.2)
BASOPHILS NFR BLD AUTO: 0.4 % — SIGNIFICANT CHANGE UP (ref 0–2)
EOSINOPHIL # BLD AUTO: 0.21 K/UL — SIGNIFICANT CHANGE UP (ref 0–0.5)
EOSINOPHIL NFR BLD AUTO: 1.3 % — SIGNIFICANT CHANGE UP (ref 0–6)
HCT VFR BLD CALC: 34.6 % — SIGNIFICANT CHANGE UP (ref 34.5–45)
HGB BLD-MCNC: 11.2 G/DL — LOW (ref 11.5–15.5)
IMM GRANULOCYTES NFR BLD AUTO: 0.9 % — SIGNIFICANT CHANGE UP (ref 0–0.9)
LYMPHOCYTES # BLD AUTO: 15 % — SIGNIFICANT CHANGE UP (ref 13–44)
LYMPHOCYTES # BLD AUTO: 2.5 K/UL — SIGNIFICANT CHANGE UP (ref 1–3.3)
MCHC RBC-ENTMCNC: 28.7 PG — SIGNIFICANT CHANGE UP (ref 27–34)
MCHC RBC-ENTMCNC: 32.4 GM/DL — SIGNIFICANT CHANGE UP (ref 32–36)
MCV RBC AUTO: 88.7 FL — SIGNIFICANT CHANGE UP (ref 80–100)
MONOCYTES # BLD AUTO: 1.21 K/UL — HIGH (ref 0–0.9)
MONOCYTES NFR BLD AUTO: 7.3 % — SIGNIFICANT CHANGE UP (ref 2–14)
NEUTROPHILS # BLD AUTO: 12.48 K/UL — HIGH (ref 1.8–7.4)
NEUTROPHILS NFR BLD AUTO: 75.1 % — SIGNIFICANT CHANGE UP (ref 43–77)
PLATELET # BLD AUTO: 233 K/UL — SIGNIFICANT CHANGE UP (ref 150–400)
RBC # BLD: 3.9 M/UL — SIGNIFICANT CHANGE UP (ref 3.8–5.2)
RBC # FLD: 13.5 % — SIGNIFICANT CHANGE UP (ref 10.3–14.5)
WBC # BLD: 16.62 K/UL — HIGH (ref 3.8–10.5)
WBC # FLD AUTO: 16.62 K/UL — HIGH (ref 3.8–10.5)

## 2024-04-04 RX ORDER — IBUPROFEN 200 MG
600 TABLET ORAL EVERY 6 HOURS
Refills: 0 | Status: DISCONTINUED | OUTPATIENT
Start: 2024-04-04 | End: 2024-04-07

## 2024-04-04 RX ADMIN — Medication 975 MILLIGRAM(S): at 21:33

## 2024-04-04 RX ADMIN — Medication 975 MILLIGRAM(S): at 22:00

## 2024-04-04 RX ADMIN — MAGNESIUM HYDROXIDE 30 MILLILITER(S): 400 TABLET, CHEWABLE ORAL at 13:22

## 2024-04-04 RX ADMIN — Medication 975 MILLIGRAM(S): at 09:37

## 2024-04-04 RX ADMIN — Medication 975 MILLIGRAM(S): at 15:18

## 2024-04-04 RX ADMIN — Medication 3 MILLIGRAM(S): at 00:18

## 2024-04-04 RX ADMIN — Medication 30 MILLIGRAM(S): at 00:38

## 2024-04-04 RX ADMIN — SIMETHICONE 80 MILLIGRAM(S): 80 TABLET, CHEWABLE ORAL at 13:22

## 2024-04-04 RX ADMIN — Medication 600 MILLIGRAM(S): at 14:00

## 2024-04-04 RX ADMIN — Medication 975 MILLIGRAM(S): at 09:13

## 2024-04-04 RX ADMIN — Medication 3 MILLIGRAM(S): at 23:06

## 2024-04-04 RX ADMIN — Medication 600 MILLIGRAM(S): at 18:46

## 2024-04-04 RX ADMIN — Medication 30 MILLIGRAM(S): at 00:01

## 2024-04-04 RX ADMIN — Medication 600 MILLIGRAM(S): at 13:22

## 2024-04-04 RX ADMIN — Medication 975 MILLIGRAM(S): at 15:35

## 2024-04-04 RX ADMIN — Medication 112 MICROGRAM(S): at 06:05

## 2024-04-04 RX ADMIN — Medication 600 MILLIGRAM(S): at 18:09

## 2024-04-04 RX ADMIN — ENOXAPARIN SODIUM 40 MILLIGRAM(S): 100 INJECTION SUBCUTANEOUS at 15:21

## 2024-04-04 NOTE — PROGRESS NOTE ADULT - SUBJECTIVE AND OBJECTIVE BOX
33y  Female POD# 1 s/p  for NRFHT after PPROM with suspected vasa previa  S: Patient is doing well and has no acute complaints. Pain is well controlled with medications. Patient is tolerating regular diet. She is OOB and urinating w/o any difficulties.  She is passing flatus. Patient is pumping breastmilk for her baby in special care nursery.     O: Vital Signs Last 24 Hrs  T(C): 36.3 (2024 08:38), Max: 36.9 (2024 13:30)  T(F): 97.3 (2024 08:38), Max: 98.5 (2024 00:00)  HR: 81 (2024 08:38) (81 - 97)  BP: 105/55 (2024 08:38) (103/80 - 118/70)  RR: 17 (2024 08:38) (16 - 17)  SpO2: 97% (2024 08:38) (97% - 99%)    Parameters below as of 2024 08:38  Patient On (Oxygen Delivery Method): room air      Appears well, resting in bed  Abdo: Fundus firm w/o tenderness   Dressing: c/d/i  PV: No calf tenderness, edema.     Labs:                         11.2   16.62 )-----------( 233      ( 2024 08:15 )             34.6

## 2024-04-04 NOTE — PROGRESS NOTE ADULT - ASSESSMENT
s/p PCS POD #1   -Doing well  -Continue to advance care as tolerated  - Continue pumping breastmilk  - Analgesia prn

## 2024-04-05 RX ORDER — IBUPROFEN 200 MG
1 TABLET ORAL
Qty: 0 | Refills: 0 | DISCHARGE
Start: 2024-04-05

## 2024-04-05 RX ORDER — ACETAMINOPHEN 500 MG
3 TABLET ORAL
Qty: 0 | Refills: 0 | DISCHARGE
Start: 2024-04-05

## 2024-04-05 RX ADMIN — Medication 975 MILLIGRAM(S): at 16:03

## 2024-04-05 RX ADMIN — Medication 600 MILLIGRAM(S): at 17:48

## 2024-04-05 RX ADMIN — Medication 600 MILLIGRAM(S): at 04:45

## 2024-04-05 RX ADMIN — Medication 3 MILLIGRAM(S): at 22:39

## 2024-04-05 RX ADMIN — Medication 975 MILLIGRAM(S): at 09:23

## 2024-04-05 RX ADMIN — Medication 975 MILLIGRAM(S): at 21:13

## 2024-04-05 RX ADMIN — Medication 975 MILLIGRAM(S): at 21:43

## 2024-04-05 RX ADMIN — Medication 600 MILLIGRAM(S): at 12:09

## 2024-04-05 RX ADMIN — SIMETHICONE 80 MILLIGRAM(S): 80 TABLET, CHEWABLE ORAL at 05:18

## 2024-04-05 RX ADMIN — Medication 112 MICROGRAM(S): at 05:15

## 2024-04-05 RX ADMIN — Medication 600 MILLIGRAM(S): at 06:39

## 2024-04-05 RX ADMIN — ENOXAPARIN SODIUM 40 MILLIGRAM(S): 100 INJECTION SUBCUTANEOUS at 16:03

## 2024-04-05 RX ADMIN — SIMETHICONE 80 MILLIGRAM(S): 80 TABLET, CHEWABLE ORAL at 21:11

## 2024-04-05 RX ADMIN — SIMETHICONE 80 MILLIGRAM(S): 80 TABLET, CHEWABLE ORAL at 12:14

## 2024-04-05 RX ADMIN — ESCITALOPRAM OXALATE 10 MILLIGRAM(S): 10 TABLET, FILM COATED ORAL at 05:15

## 2024-04-05 NOTE — DISCHARGE NOTE OB - HOSPITAL COURSE
Patient underwent a  delivery. Post-op course was uncomplicated. Pain is well controlled with PRN medication. She has no difficulty with ambulation, voiding, or PO intake. Lab values and vital signs are within normal limits prior to discharge.

## 2024-04-05 NOTE — DISCHARGE NOTE OB - CARE PROVIDER_API CALL
Anel Duenas  Obstetrics and Gynecology  [address] [facility]  [place] [address]  Phone: [phone]  Fax: [phone]  Follow Up Time: 1 week

## 2024-04-05 NOTE — PROGRESS NOTE ADULT - SUBJECTIVE AND OBJECTIVE BOX
Postpartum Note,  Section  She is a  33y woman who is now post-operative day 2:     Subjective:  The patient feels well.  She is ambulating.   She is tolerating regular diet.  She denies nausea and vomiting.  She is voiding.  Her pain is controlled.  She reports normal postpartum bleeding.      Physical exam:    Vital Signs Last 24 Hrs  T(C): 36.8 (2024 04:30), Max: 36.8 (2024 04:30)  T(F): 98.3 (2024 04:30), Max: 98.3 (2024 04:30)  HR: 69 (2024 04:30) (69 - 91)  BP: 117/75 (2024 04:30) (116/65 - 119/76)  BP(mean): --  RR: 16 (2024 04:30) (16 - 18)  SpO2: 99% (2024 04:30) (98% - 100%)    Parameters below as of 2024 04:30  Patient On (Oxygen Delivery Method): room air        Gen: NAD  Breast: Soft, nontender, not engorged.  Abdomen: Soft, nontender, no distension , firm uterine fundus at umbilicus.  Dressing: Clean, dry, and intact   Pelvic: Normal lochia noted  Ext: No calf tenderness    LABS:                        11.2   16.62 )-----------( 233      ( 2024 08:15 )             34.6       Rubella status:     Allergies    No Known Allergies    Intolerances      MEDICATIONS  (STANDING):  acetaminophen     Tablet .. 975 milliGRAM(s) Oral <User Schedule>  budesonide  80 MICROgram(s)/formoterol 4.5 MICROgram(s) Inhaler 2 Puff(s) Inhalation two times a day  citric acid/sodium citrate Solution 30 milliLiter(s) Oral once  dextrose 5% + lactated ringers. 1000 milliLiter(s) (125 mL/Hr) IV Continuous <Continuous>  diphtheria/tetanus/pertussis (acellular) Vaccine (Adacel) 0.5 milliLiter(s) IntraMuscular once  enoxaparin Injectable 40 milliGRAM(s) SubCutaneous every 24 hours  escitalopram 10 milliGRAM(s) Oral daily  famotidine Injectable 20 milliGRAM(s) IV Push once  ibuprofen  Tablet. 600 milliGRAM(s) Oral every 6 hours  lactated ringers. 1000 milliLiter(s) (125 mL/Hr) IV Continuous <Continuous>  lactated ringers. 1000 milliLiter(s) (125 mL/Hr) IV Continuous <Continuous>  levothyroxine 112 MICROGram(s) Oral daily  melatonin 3 milliGRAM(s) Oral at bedtime  oxytocin Infusion 333.333 milliUNIT(s)/Min (1000 mL/Hr) IV Continuous <Continuous>    MEDICATIONS  (PRN):  diphenhydrAMINE 25 milliGRAM(s) Oral every 6 hours PRN Pruritus  lanolin Ointment 1 Application(s) Topical every 6 hours PRN Sore Nipples  magnesium hydroxide Suspension 30 milliLiter(s) Oral two times a day PRN Constipation  oxyCODONE    IR 5 milliGRAM(s) Oral every 3 hours PRN Moderate to Severe Pain (4-10)  oxyCODONE    IR 5 milliGRAM(s) Oral once PRN Moderate to Severe Pain (4-10)  simethicone 80 milliGRAM(s) Chew every 4 hours PRN Gas        Assessment and Plan  POD #2 s/p  section  Doing well.  Encourage ambulation.

## 2024-04-05 NOTE — DISCHARGE NOTE OB - NS MD DC FALL RISK RISK
For information on Fall & Injury Prevention, visit: [email or web address] OR  [email or web address] OR  https://www.cdc.gov/steadi/patient.html

## 2024-04-05 NOTE — DISCHARGE NOTE OB - MEDICATION SUMMARY - MEDICATIONS TO TAKE
I will START or STAY ON the medications listed below when I get home from the hospital:    ibuprofen 600 mg oral tablet  -- 1 tab(s) by mouth every 6 hours as needed for  moderate pain  -- Indication: For Pain    acetaminophen 325 mg oral tablet  -- 3 tab(s) by mouth every 6 hours as needed for  moderate pain  -- Indication: For Pain    Lexapro 10 mg oral tablet  -- 1 tab(s) by mouth once a day  -- Indication: For depression    Prenatal 1 oral capsule  -- orally once a day  -- Indication: For Vitamins    Synthroid 112 mcg (0.112 mg) oral tablet  -- 1 tab(s) by mouth once a day  -- Indication: For Hypothyroidism

## 2024-04-05 NOTE — DISCHARGE NOTE OB - BREASTFEEDING PROVIDES MATERNAL HEALTH BENEFITS, DECREASED PREMENOPAUSAL BREAST CANCER, OVARIAN CANCER AND TYPE II DIABETES MELLITUS
Problem: Knowledge Deficit - Standard  Goal: Patient and family/care givers will demonstrate understanding of plan of care, disease process/condition, diagnostic tests and medications  Outcome: Progressing     Problem: Pain - Standard  Goal: Alleviation of pain or a reduction in pain to the patient’s comfort goal  Outcome: Progressing   The patient is Stable - Low risk of patient condition declining or worsening    Shift Goals  Clinical Goals: Mobility, pain management.  Patient Goals: Pain management, comfort.  Family Goals: Updates.    Progress made toward(s) clinical / shift goals:      Patient is not progressing towards the following goals:       Statement Selected

## 2024-04-05 NOTE — DISCHARGE NOTE OB - CARE PLAN
Principal Discharge DX:	Single delivery by  section  Assessment and plan of treatment:	Patient underwent a  delivery. Post-op course was uncomplicated. Pain is well controlled with PRN medication. She has no difficulty with ambulation, voiding, or PO intake. Lab values and vital signs are within normal limits prior to discharge.  Please call your provider to schedule postoperative wound check visit in 1-2 weeks. Take medications as directed, regular diet, activity as tolerated. Exclusive breast feeding for the first 6 months is recommended. Nothing per vagina for 6 weeks (incl. sex, douching, etc). If you have additional concerns, please inform your provider.   1 Principal Discharge DX:	Single delivery by  section  Assessment and plan of treatment:	Patient underwent a  delivery. Post-op course was uncomplicated. Pain is well controlled with PRN medication. She has no difficulty with ambulation, voiding, or PO intake. Lab values and vital signs are within normal limits prior to discharge.  Please call your provider to schedule postoperative wound check visit in 1-2 weeks. Take medications as directed, regular diet, activity as tolerated. Exclusive breast feeding for the first 6 months is recommended. Nothing per vagina for 6 weeks (incl. sex, douching, etc). If you have additional concerns, please inform your provider.  -Make appointment with cardio OB in 2-3 weeks for BP control

## 2024-04-05 NOTE — DISCHARGE NOTE OB - PLAN OF CARE
Patient underwent a  delivery. Post-op course was uncomplicated. Pain is well controlled with PRN medication. She has no difficulty with ambulation, voiding, or PO intake. Lab values and vital signs are within normal limits prior to discharge.  Please call your provider to schedule postoperative wound check visit in 1-2 weeks. Take medications as directed, regular diet, activity as tolerated. Exclusive breast feeding for the first 6 months is recommended. Nothing per vagina for 6 weeks (incl. sex, douching, etc). If you have additional concerns, please inform your provider. Patient underwent a  delivery. Post-op course was uncomplicated. Pain is well controlled with PRN medication. She has no difficulty with ambulation, voiding, or PO intake. Lab values and vital signs are within normal limits prior to discharge.  Please call your provider to schedule postoperative wound check visit in 1-2 weeks. Take medications as directed, regular diet, activity as tolerated. Exclusive breast feeding for the first 6 months is recommended. Nothing per vagina for 6 weeks (incl. sex, douching, etc). If you have additional concerns, please inform your provider.  -Make appointment with cardio OB in 2-3 weeks for BP control

## 2024-04-05 NOTE — DISCHARGE NOTE OB - PATIENT PORTAL LINK FT
You can access the FollowMyHealth Patient Portal offered by [facility] by registering at the following website: [email or web address]. By joining [email or web address] FollowMyHealth portal, you will also be able to view your health information using other applications (apps) compatible with our system.

## 2024-04-05 NOTE — DISCHARGE NOTE OB - FALL HARM RISK - PT AGE POPULATION HIDDEN
Patient's mom calling to check the status of her son's test results. Mom would like a call back.   Adult

## 2024-04-06 RX ADMIN — Medication 975 MILLIGRAM(S): at 03:24

## 2024-04-06 RX ADMIN — Medication 600 MILLIGRAM(S): at 06:15

## 2024-04-06 RX ADMIN — Medication 975 MILLIGRAM(S): at 16:00

## 2024-04-06 RX ADMIN — Medication 600 MILLIGRAM(S): at 06:45

## 2024-04-06 RX ADMIN — Medication 975 MILLIGRAM(S): at 22:48

## 2024-04-06 RX ADMIN — BUDESONIDE AND FORMOTEROL FUMARATE DIHYDRATE 2 PUFF(S): 160; 4.5 AEROSOL RESPIRATORY (INHALATION) at 20:14

## 2024-04-06 RX ADMIN — Medication 600 MILLIGRAM(S): at 12:24

## 2024-04-06 RX ADMIN — SIMETHICONE 80 MILLIGRAM(S): 80 TABLET, CHEWABLE ORAL at 03:25

## 2024-04-06 RX ADMIN — Medication 112 MICROGRAM(S): at 06:16

## 2024-04-06 RX ADMIN — Medication 975 MILLIGRAM(S): at 15:20

## 2024-04-06 RX ADMIN — Medication 975 MILLIGRAM(S): at 09:24

## 2024-04-06 RX ADMIN — Medication 600 MILLIGRAM(S): at 00:52

## 2024-04-06 RX ADMIN — ENOXAPARIN SODIUM 40 MILLIGRAM(S): 100 INJECTION SUBCUTANEOUS at 15:20

## 2024-04-06 RX ADMIN — Medication 600 MILLIGRAM(S): at 18:08

## 2024-04-06 RX ADMIN — Medication 975 MILLIGRAM(S): at 03:54

## 2024-04-06 RX ADMIN — Medication 600 MILLIGRAM(S): at 23:52

## 2024-04-06 RX ADMIN — Medication 975 MILLIGRAM(S): at 22:24

## 2024-04-06 RX ADMIN — Medication 600 MILLIGRAM(S): at 12:17

## 2024-04-06 RX ADMIN — Medication 600 MILLIGRAM(S): at 00:22

## 2024-04-06 RX ADMIN — SIMETHICONE 80 MILLIGRAM(S): 80 TABLET, CHEWABLE ORAL at 18:08

## 2024-04-06 RX ADMIN — Medication 975 MILLIGRAM(S): at 08:58

## 2024-04-06 RX ADMIN — Medication 600 MILLIGRAM(S): at 19:00

## 2024-04-06 RX ADMIN — SIMETHICONE 80 MILLIGRAM(S): 80 TABLET, CHEWABLE ORAL at 12:18

## 2024-04-06 RX ADMIN — ESCITALOPRAM OXALATE 10 MILLIGRAM(S): 10 TABLET, FILM COATED ORAL at 15:21

## 2024-04-06 RX ADMIN — ESCITALOPRAM OXALATE 10 MILLIGRAM(S): 10 TABLET, FILM COATED ORAL at 06:16

## 2024-04-06 NOTE — PROGRESS NOTE ADULT - SUBJECTIVE AND OBJECTIVE BOX
BAILEY OMER is a 33y  now POD#3 s/p primary  section at 32w1d weeks gestation 2/2 PPROM and cat II tracing, c/b vasa previa.    S:    No acute events overnight.   Pt c/o feeling hot/sweats overnight but otherwise feels well.  Pain controlled with current treatment regimen.   She is ambulating without difficulty and tolerating PO.   + flatus/+BM/+ voiding   She endorses appropriate lochia, which is decreasing.   She is pumping well for baby in NICU.  She denies fevers, nausea and vomiting.   She denies lightheadedness, dizziness, palpitations, chest pain and SOB.     O:    T(C): 36.8 (24 @ 03:26), Max: 36.8 (24 @ 03:26)  HR: 85 (24 @ 03:26) (79 - 88)  BP: 122/75 (24 @ 03:26) (112/83 - 129/57)  RR: 17 (24 @ 03:26) (16 - 17)  SpO2: 99% (24 @ 03:26) (98% - 99%)    Gen: NAD, resting comfortably  CV: RRR, S1/S2 present  Pulm: CTAB  Abdomen:  Soft, non-tender, non-distended, +bowel sounds  Incision: Clean/dry/intact  Uterus:  Fundus firm below umbilicus  VE:  Expectant lochia  Ext:  Non-tender and non-edematous                          11.2   16.62 )-----------( 233      ( 2024 08:15 )             34.6             A/P:    -Vital signs stable  -Hgb: 12.1 -> 11.2  -Voiding, tolerating PO, bowel function nml   -Advance care as tolerated   -Continue routine postpartum and postoperative care and education  -Healthy male infant, desires circumcision  -Dispo: Patient to be discharged when meeting all postpartum and postoperative milestones and pending attending approval. Patient needs a refill on his Flomax 0.4 and Furosemide 80 mg [facility] [place].    BAILEY OMER is a 33y  now POD#3 s/p primary  section at 32w1d weeks gestation 2/2 PPROM and cat II tracing, c/b vasa previa.    S:    No acute events overnight.   Pt c/o feeling hot/sweats overnight but otherwise feels well.  Pain controlled with current treatment regimen.   She is ambulating without difficulty and tolerating PO.   + flatus/+BM/+ voiding   She endorses appropriate lochia, which is decreasing.   She is pumping well for baby in NICU.  She denies fevers, nausea and vomiting.   She denies lightheadedness, dizziness, palpitations, chest pain and SOB.     O:    T(C): 36.8 (24 @ 03:26), Max: 36.8 (24 @ 03:26)  HR: 85 (24 @ 03:26) (79 - 88)  BP: 122/75 (24 @ 03:26) (112/83 - 129/57)  RR: 17 (24 @ 03:26) (16 - 17)  SpO2: 99% (24 @ 03:26) (98% - 99%)    Gen: NAD, resting comfortably  CV: RRR, S1/S2 present  Pulm: CTAB  Abdomen:  Soft, non-tender, non-distended, +bowel sounds  Incision: Clean/dry/intact  Uterus:  Fundus firm below umbilicus  VE:  Expectant lochia  Ext:  Non-tender and non-edematous                          11.2   16.62 )-----------( 233      ( 2024 08:15 )             34.6             A/P:    -Vital signs stable  -Hgb: 12.1 -> 11.2  -Voiding, tolerating PO, bowel function nml   -Advance care as tolerated   -Continue routine postpartum and postoperative care and education  -Male infant in NICU doing well, desires circumcision  -Dispo: Patient to be discharged when meeting all postpartum and postoperative milestones and pending attending approval.

## 2024-04-07 VITALS
OXYGEN SATURATION: 99 % | TEMPERATURE: 99 F | SYSTOLIC BLOOD PRESSURE: 127 MMHG | RESPIRATION RATE: 18 BRPM | HEART RATE: 80 BPM | DIASTOLIC BLOOD PRESSURE: 82 MMHG

## 2024-04-07 RX ADMIN — Medication 975 MILLIGRAM(S): at 15:51

## 2024-04-07 RX ADMIN — Medication 112 MICROGRAM(S): at 06:04

## 2024-04-07 RX ADMIN — BUDESONIDE AND FORMOTEROL FUMARATE DIHYDRATE 2 PUFF(S): 160; 4.5 AEROSOL RESPIRATORY (INHALATION) at 09:10

## 2024-04-07 RX ADMIN — Medication 975 MILLIGRAM(S): at 03:15

## 2024-04-07 RX ADMIN — Medication 600 MILLIGRAM(S): at 06:40

## 2024-04-07 RX ADMIN — ENOXAPARIN SODIUM 40 MILLIGRAM(S): 100 INJECTION SUBCUTANEOUS at 15:22

## 2024-04-07 RX ADMIN — Medication 600 MILLIGRAM(S): at 06:04

## 2024-04-07 RX ADMIN — Medication 975 MILLIGRAM(S): at 09:47

## 2024-04-07 RX ADMIN — Medication 600 MILLIGRAM(S): at 18:04

## 2024-04-07 RX ADMIN — ESCITALOPRAM OXALATE 10 MILLIGRAM(S): 10 TABLET, FILM COATED ORAL at 12:38

## 2024-04-07 RX ADMIN — Medication 975 MILLIGRAM(S): at 15:22

## 2024-04-07 RX ADMIN — Medication 600 MILLIGRAM(S): at 12:38

## 2024-04-07 RX ADMIN — SIMETHICONE 80 MILLIGRAM(S): 80 TABLET, CHEWABLE ORAL at 12:38

## 2024-04-07 RX ADMIN — Medication 600 MILLIGRAM(S): at 12:51

## 2024-04-07 RX ADMIN — Medication 975 MILLIGRAM(S): at 08:52

## 2024-04-07 RX ADMIN — Medication 600 MILLIGRAM(S): at 00:30

## 2024-04-07 RX ADMIN — Medication 975 MILLIGRAM(S): at 04:00

## 2024-04-07 NOTE — PROGRESS NOTE ADULT - ATTENDING COMMENTS
Patient voiding and ambulating with no issues, passing gas, had a BM, incision C/D/I, denies any PEC symptoms, likely GHTN, BP stable, patient for discharge home today with BP and wound check in one week. Baby doing well in NICU.

## 2024-04-07 NOTE — PROGRESS NOTE ADULT - SUBJECTIVE AND OBJECTIVE BOX
BAILEY OMER is a 33y  now POD#4 s/p primary  section at 32w1d weeks gestation 2/2 PPROM and cat II tracing, c/b vasa previa.    S:    No acute events overnight.   Pt c/o feeling hot/sweats overnight but otherwise feels well.  Pain controlled with current treatment regimen.   She is ambulating without difficulty and tolerating PO.   + flatus/+BM/+ voiding   She endorses appropriate lochia, which is decreasing.   She is pumping well for baby in NICU.  She denies fevers, nausea and vomiting.   She denies lightheadedness, dizziness, palpitations, chest pain and SOB.     O:    Vital Signs Last 24 Hrs  T(C): 36.8 (2024 06:12), Max: 37.3 (2024 15:31)  T(F): 98.2 (2024 06:12), Max: 99.1 (2024 15:31)  HR: 71 (2024 06:12) (69 - 79)  BP: 107/88 (2024 06:12) (107/88 - 140/76)  BP(mean): 92 (2024 06:12) (87 - 97)  RR: 18 (2024 06:12) (16 - 18)  SpO2: 99% (2024 06:12) (97% - 99%)    Parameters below as of 2024 06:12  Patient On (Oxygen Delivery Method): room air    Gen: NAD, resting comfortably  Rsp: breathing comfortably on RA  Abdomen:  Soft, non-tender, non-distended, +bowel sounds  Incision: Clean/dry/intact with Mepilex in place  Uterus:  Fundus firm below umbilicus  VE:  Expectant lochia  Ext:  Non-tender and non-edematous

## 2024-04-07 NOTE — PROGRESS NOTE ADULT - ASSESSMENT
A/P:  BAILEY OMER is a 33y  now POD#4 s/p primary  section at 32w1d weeks gestation 2/2 PPROM and cat II tracing, c/b vasa previa.  -Vital signs stable  -Hgb: 12.1 -> 11.2  -Voiding, tolerating PO, bowel function nml   -Advance care as tolerated   -Continue routine postpartum and postoperative care and education  -Male infant in NICU doing well, desires circumcision  -Dispo: Anticipate discharge today pending attending approval.

## 2024-04-09 LAB — SURGICAL PATHOLOGY STUDY: SIGNIFICANT CHANGE UP

## 2024-04-16 DIAGNOSIS — J45.909 UNSPECIFIED ASTHMA, UNCOMPLICATED: ICD-10-CM

## 2024-04-16 DIAGNOSIS — Z3A.32 32 WEEKS GESTATION OF PREGNANCY: ICD-10-CM

## 2024-04-16 DIAGNOSIS — F41.9 ANXIETY DISORDER, UNSPECIFIED: ICD-10-CM

## 2024-04-16 DIAGNOSIS — O43.193 OTHER MALFORMATION OF PLACENTA, THIRD TRIMESTER: ICD-10-CM

## 2024-04-16 DIAGNOSIS — Z28.09 IMMUNIZATION NOT CARRIED OUT BECAUSE OF OTHER CONTRAINDICATION: ICD-10-CM

## 2024-04-16 DIAGNOSIS — F32.A DEPRESSION, UNSPECIFIED: ICD-10-CM

## 2024-04-16 DIAGNOSIS — Z79.890 HORMONE REPLACEMENT THERAPY: ICD-10-CM

## 2024-04-16 DIAGNOSIS — E03.9 HYPOTHYROIDISM, UNSPECIFIED: ICD-10-CM

## 2024-04-27 NOTE — OB RN PATIENT PROFILE - FUNCTIONAL ASSESSMENT - DAILY ACTIVITY 3.
Patient/Caregiver/Family/Facility findings/issues during SN visit:  pt resting comfortably in bed, responds to tactile stimuli but quickly goes back to sleep. Staff report that she ate some apple sauce and drank juice this morning    Medication refills ordered this visit: none    Medications reconciled and all medications are not available in the home this visit.  The following education was provided regarding medications, medication interactions, and look alike medications.  Response to teaching: fair. Medications are effective at this time.      Supplies by type and quantity ordered this visit include: MAHENDRA    Consulted medical director/attending physician regarding: NA    Instructed patient/family/caregiver on 24-hour hospice availability and phone number.    Plan for next visit:  daily visit for symptom management
4 = No assist / stand by assistance

## 2024-08-21 NOTE — ED ADULT TRIAGE NOTE - CCCP TRG CHIEF CMPLNT
Problem: Discharge Planning  Goal: Discharge to home or other facility with appropriate resources  Outcome: Progressing     Problem: Safety - Adult  Goal: Free from fall injury  Outcome: Progressing     Problem: Skin/Tissue Integrity  Goal: Absence of new skin breakdown  Description: 1.  Monitor for areas of redness and/or skin breakdown  2.  Assess vascular access sites hourly  3.  Every 4-6 hours minimum:  Change oxygen saturation probe site  4.  Every 4-6 hours:  If on nasal continuous positive airway pressure, respiratory therapy assess nares and determine need for appliance change or resting period.  Outcome: Progressing     Problem: Chronic Conditions and Co-morbidities  Goal: Patient's chronic conditions and co-morbidity symptoms are monitored and maintained or improved  Outcome: Progressing     Problem: Pain  Goal: Verbalizes/displays adequate comfort level or baseline comfort level  Outcome: Progressing      vaginal bleeding

## 2024-11-04 NOTE — OB RN PATIENT PROFILE - NSSDOHUNDER_OBGYN_A_OB
Health Maintenance       DTaP/Tdap/Td Vaccine (1 - Tdap)  Never done    Influenza Vaccine (1)  Never done    COVID-19 Vaccine (1 - 2024-25 season)  Never done    Depression Screening (Yearly)  Due since 11/1/2024           Following review of the above:  Patient is not proceeding with: COVID-19, Dtap/Tdap/Td, and Influenza    Note: Refer to final orders and clinician documentation.       no

## 2025-06-06 NOTE — ED ADULT TRIAGE NOTE - GLASGOW COMA SCALE: EYE OPENING, MLM
----- Message from Brie He MD sent at 6/6/2025  7:30 AM CDT -----  Swabone positive for candidiasis   Please treat with Diflucan 150mg x2 doses day 1 and 3    (E4) spontaneous

## 2025-06-18 NOTE — OB RN INTRAOPERATIVE NOTE - NS_CELLSAVER _OBGYN_ALL_OB
Called patient. Left message for call back to office or to check live well messages. Sent live well message.    No